# Patient Record
Sex: FEMALE | URBAN - NONMETROPOLITAN AREA
[De-identification: names, ages, dates, MRNs, and addresses within clinical notes are randomized per-mention and may not be internally consistent; named-entity substitution may affect disease eponyms.]

---

## 2023-04-28 ENCOUNTER — TRANSCRIBE ORDERS (OUTPATIENT)
Dept: ADMINISTRATIVE | Facility: HOSPITAL | Age: 43
End: 2023-04-28

## 2023-04-28 DIAGNOSIS — Z12.31 VISIT FOR SCREENING MAMMOGRAM: Primary | ICD-10-CM

## 2023-08-04 ENCOUNTER — HOSPITAL ENCOUNTER (OUTPATIENT)
Dept: MAMMOGRAPHY | Facility: HOSPITAL | Age: 43
Discharge: HOME | End: 2023-08-04
Admitting: NURSE PRACTITIONER
Payer: COMMERCIAL

## 2023-08-04 DIAGNOSIS — Z12.31 VISIT FOR SCREENING MAMMOGRAM: ICD-10-CM

## 2023-08-04 PROCEDURE — 77067 SCR MAMMO BI INCL CAD: CPT

## 2023-08-04 PROCEDURE — 77063 BREAST TOMOSYNTHESIS BI: CPT

## 2023-09-08 ENCOUNTER — DISEASE STATE MANAGEMENT VISIT (OUTPATIENT)
Dept: PHARMACY | Facility: HOSPITAL | Age: 43
End: 2023-09-08
Payer: COMMERCIAL

## 2023-09-08 ENCOUNTER — LAB (OUTPATIENT)
Dept: LAB | Facility: HOSPITAL | Age: 43
End: 2023-09-08
Payer: COMMERCIAL

## 2023-09-08 VITALS
DIASTOLIC BLOOD PRESSURE: 73 MMHG | TEMPERATURE: 98.2 F | SYSTOLIC BLOOD PRESSURE: 121 MMHG | WEIGHT: 148.6 LBS | OXYGEN SATURATION: 99 % | HEART RATE: 104 BPM

## 2023-09-08 DIAGNOSIS — F19.91 HISTORY OF ILLICIT DRUG USE: ICD-10-CM

## 2023-09-08 DIAGNOSIS — F10.21 HISTORY OF ALCOHOLISM: ICD-10-CM

## 2023-09-08 DIAGNOSIS — B18.2 CHRONIC HEPATITIS C WITHOUT HEPATIC COMA: ICD-10-CM

## 2023-09-08 DIAGNOSIS — B18.2 CHRONIC HEPATITIS C WITHOUT HEPATIC COMA: Primary | ICD-10-CM

## 2023-09-08 LAB
AMPHET+METHAMPHET UR QL: NEGATIVE
AMPHETAMINES UR QL: NEGATIVE
BARBITURATES UR QL SCN: NEGATIVE
BENZODIAZ UR QL SCN: NEGATIVE
BUPRENORPHINE SERPL-MCNC: NEGATIVE NG/ML
CANNABINOIDS SERPL QL: NEGATIVE
COCAINE UR QL: NEGATIVE
DEPRECATED RDW RBC AUTO: 39.9 FL (ref 37–54)
ERYTHROCYTE [DISTWIDTH] IN BLOOD BY AUTOMATED COUNT: 12.7 % (ref 12.3–15.4)
HBV SURFACE AG SERPL QL IA: NORMAL
HCG SERPL QL: NEGATIVE
HCT VFR BLD AUTO: 38.2 % (ref 34–46.6)
HGB BLD-MCNC: 13 G/DL (ref 12–15.9)
INR PPP: 0.97 (ref 0.9–1.1)
MCH RBC QN AUTO: 29.6 PG (ref 26.6–33)
MCHC RBC AUTO-ENTMCNC: 34 G/DL (ref 31.5–35.7)
MCV RBC AUTO: 87 FL (ref 79–97)
METHADONE UR QL SCN: NEGATIVE
OPIATES UR QL: NEGATIVE
OXYCODONE UR QL SCN: NEGATIVE
PCP UR QL SCN: NEGATIVE
PLATELET # BLD AUTO: 340 10*3/MM3 (ref 140–450)
PMV BLD AUTO: 11.9 FL (ref 6–12)
PROPOXYPH UR QL: NEGATIVE
PROTHROMBIN TIME: 13.4 SECONDS (ref 12.3–15.1)
RBC # BLD AUTO: 4.39 10*6/MM3 (ref 3.77–5.28)
TRICYCLICS UR QL SCN: NEGATIVE
WBC NRBC COR # BLD: 10.62 10*3/MM3 (ref 3.4–10.8)

## 2023-09-08 PROCEDURE — 86708 HEPATITIS A ANTIBODY: CPT

## 2023-09-08 PROCEDURE — 87522 HEPATITIS C REVRS TRNSCRPJ: CPT

## 2023-09-08 PROCEDURE — 86704 HEP B CORE ANTIBODY TOTAL: CPT

## 2023-09-08 PROCEDURE — G0463 HOSPITAL OUTPT CLINIC VISIT: HCPCS

## 2023-09-08 PROCEDURE — 80306 DRUG TEST PRSMV INSTRMNT: CPT

## 2023-09-08 PROCEDURE — 87340 HEPATITIS B SURFACE AG IA: CPT

## 2023-09-08 PROCEDURE — 80053 COMPREHEN METABOLIC PANEL: CPT

## 2023-09-08 PROCEDURE — 85027 COMPLETE CBC AUTOMATED: CPT

## 2023-09-08 PROCEDURE — 84703 CHORIONIC GONADOTROPIN ASSAY: CPT

## 2023-09-08 PROCEDURE — 36415 COLL VENOUS BLD VENIPUNCTURE: CPT

## 2023-09-08 PROCEDURE — 85610 PROTHROMBIN TIME: CPT

## 2023-09-08 RX ORDER — FLUTICASONE PROPIONATE 50 MCG
2 SPRAY, SUSPENSION (ML) NASAL DAILY PRN
COMMUNITY
Start: 2023-08-08

## 2023-09-08 RX ORDER — ESCITALOPRAM OXALATE 20 MG/1
20 TABLET ORAL DAILY
COMMUNITY
Start: 2023-09-07

## 2023-09-08 RX ORDER — LORATADINE 10 MG/1
10 TABLET ORAL DAILY
COMMUNITY
Start: 2023-05-15

## 2023-09-08 RX ORDER — BUPROPION HYDROCHLORIDE 150 MG/1
150 TABLET, EXTENDED RELEASE ORAL 2 TIMES DAILY
COMMUNITY
Start: 2023-09-07

## 2023-09-08 NOTE — PROGRESS NOTES
New Patient Consult      Date: 2023   Patient Name: Shameka Masters  MRN: [id number]  : 1980     Primary Care Provider: Carmen Elkins APRN  Referring Provider: Severo    Chief Complaint   Patient presents with    Hepatitis C     Pt here for initial Hep C eval     History of Present Illness: Shameka Masters is a 42 y.o. female who is here today as a consultation with Gastroenterology for evaluation of Hepatitis C.    She found out about having Hepatitis C infection approx 5 months ago. She has not had prior treatment for hepatitis. Reports no known personal history of liver disease including other viral hepatitis. There is no known family history of liver disease or cirrhosis. She admits to previous intranasal drug use. No IVDU. She does not have nonprofessional tattoos. Admits to having previous alcoholism, drank 6+ alcoholic beverages per day for approx 2 years. Has been clean now since a few months ago. 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 She does not currently drink alcohol. She denies  current illicit drug use including marijuana. No recent liver imaging. She has not had recent labs. She has had previous vaccinations for Hepatitis A and B. She is currently working full time as a nurse. No current GI complaints today, has known history of anxiety.     Subjective      Past Medical History:   Diagnosis Date    Allergic rhinitis     Generalized anxiety disorder      History reviewed. No pertinent surgical history.    Family History   Problem Relation Age of Onset    Breast cancer Maternal Aunt      Social History     Socioeconomic History    Marital status: Unknown   Tobacco Use    Smoking status: Every Day     Packs/day: 0.50     Types: Cigarettes    Smokeless tobacco: Never   Vaping Use    Vaping Use: Never used   Substance and Sexual Activity    Alcohol use: Not Currently    Drug use: Not Currently    Sexual activity: Defer     Current Outpatient Medications:     buPROPion SR (WELLBUTRIN  SR) 150 MG 12 hr tablet, Take 1 tablet by mouth 2 (Two) Times a Day., Disp: , Rfl:     escitalopram (LEXAPRO) 20 MG tablet, Take 1 tablet by mouth Daily., Disp: , Rfl:     fluticasone (FLONASE) 50 MCG/ACT nasal spray, 2 sprays into the nostril(s) as directed by provider Daily As Needed., Disp: , Rfl:     loratadine (CLARITIN) 10 MG tablet, Take 1 tablet by mouth Daily., Disp: , Rfl:     No Known Allergies    The following portions of the patient's history were reviewed and updated as appropriate: allergies, current medications, past family history, past medical history, past social history, past surgical history and problem list.    Objective     Physical Exam  Vitals reviewed.   Constitutional:       General: She is not in acute distress.     Appearance: Normal appearance. She is well-developed. She is not ill-appearing or diaphoretic.   HENT:      Head: Normocephalic and atraumatic.      Right Ear: External ear normal.      Left Ear: External ear normal.      Nose: Nose normal.   Eyes:      General: No scleral icterus.        Right eye: No discharge.         Left eye: No discharge.      Conjunctiva/sclera: Conjunctivae normal.   Neck:      Vascular: No JVD.   Cardiovascular:      Rate and Rhythm: Normal rate and regular rhythm.      Heart sounds: Normal heart sounds. No murmur heard.    No friction rub. No gallop.   Pulmonary:      Effort: Pulmonary effort is normal. No respiratory distress.      Breath sounds: Normal breath sounds. No wheezing or rales.   Chest:      Chest wall: No tenderness.   Abdominal:      General: Bowel sounds are normal. There is no distension.      Palpations: Abdomen is soft. There is no mass.      Tenderness: There is no abdominal tenderness. There is no guarding.   Musculoskeletal:         General: No deformity. Normal range of motion.      Cervical back: Normal range of motion.   Skin:     General: Skin is warm and dry.      Findings: No erythema or rash.   Neurological:      Mental  Status: She is alert and oriented to person, place, and time.      Coordination: Coordination normal.   Psychiatric:         Behavior: Behavior normal.         Thought Content: Thought content normal.         Judgment: Judgment normal.      Comments: Anxious affect       Vitals:    09/08/23 1128   BP: 121/73   Pulse: 104   Temp: 98.2 °F (36.8 °C)   SpO2: 99%   Weight: 67.4 kg (148 lb 9.6 oz)     Results Review:   I have reviewed the patient's new clinical and imaging results.    Labs HCVRNA quant 7040, hep C genotype Ia, TSH 1.220, hepatitis C antibody positive, triglycerides 91, total cholesterol 189, LDL cholesterol 128, alkaline phosphatase 69, ALT 18, AST 15, total bilirubin 0.5, creatinine 0.65, glucose 99, sodium 141, HIV negative, hemoglobin 14.5, hematocrit 42.3, MCV 88, platelets 415,000.    No recent abdominal imaging to review.    Assessment / Plan      1. Chronic hepatitis C without hepatic coma  2. History of alcoholism  3. History of illicit drug use  She has chronic hepatitis C infection, genotype Ia, treatment naïve, without suspected cirrhosis.  She will have labs today to further further evaluation of her chronic hepatitis C infection as well as for consideration for treatment.    More recommendations will be made after these results have been reviewed. She will continue to abstain from alcohol and illegal drugs. Reports prior vaccinations for both Hepatitis A and B in the past. After review of lab results and discussion with with the patient, we will proceed with Hep C therapy and will submit Rx to insurance company for approval. Treatment will depend on fibrosis score and Hep C genotype. When approved, she will  Rx from our pharmacy and start taking exactly as directed. Hep C viral load lab (HCV RNA quant) and CMP will be checked 4 weeks after start of therapy, at end of therapy and 3 months s/p therapy to determine response to treatment and cure. She will call with concerns.     - CBC (No  Diff); Future  - Comprehensive Metabolic Panel; Future  - hCG, Serum, Qualitative; Future  - HCV FibroSURE; Future  - Hepatitis C RNA, Quantitative, PCR (graph); Future  - Hepatitis A Antibody, Total; Future  - Hepatitis B Core Antibody, Total; Future  - Hepatitis B Surface Antigen; Future  - Protime-INR; Future    - Urine Drug Screen - Urine, Clean Catch; Future    Follow Up:   Return in about 6 weeks (around 10/20/2023) for recheck Hepatitis C (after starting treatment). Will prescribe treatment based on results, she will follow up 1 month after.     Yvette Greene PA-C  [facility]  9/8/2023  16:51 EDT    Dictated Utilizing Dragon Dictation: Part of this note may be an electronic transcription/translation of spoken language to printed text using the Dragon Dictation System.

## 2023-09-08 NOTE — LETTER
2023     ADELE Meléndez  [address] [place] [address]  [place] [id number]    Patient: Shameka Masters   YOB: 1980   Date of Visit: 2023       Dear ADELE Meléndez,    Thank you for referring Shameka Masters to me for evaluation. Below is a copy of my consult note.    If you have questions, please do not hesitate to call me. I look forward to following Shameka along with you.         Sincerely,        [facility]        CC: No Recipients         New Patient Consult      Date: 2023   Patient Name: Shameka Masters  MRN: [id number]  : 1980     Primary Care Provider: Carmen Elkins APRN  Referring Provider: Severo    Chief Complaint   Patient presents with   • Hepatitis C     Pt here for initial Hep C eval     History of Present Illness: Shameka Masters is a 42 y.o. female who is here today as a consultation with Gastroenterology for evaluation of Hepatitis C.    She found out about having Hepatitis C infection approx 5 months ago. She has not had prior treatment for hepatitis. Reports no known personal history of liver disease including other viral hepatitis. There is no known family history of liver disease or cirrhosis. She admits to previous intranasal drug use. No IVDU. She does not have nonprofessional tattoos. Admits to having previous alcoholism, drank 6+ alcoholic beverages per day for approx 2 years. Has been clean now since a few months ago. 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 She denies  current illicit drug use including marijuana. No recent liver imaging. She has not had recent labs. She has had previous vaccinations for Hepatitis A and B. She is currently working full time as a nurse. No current GI complaints today, has known history of anxiety.     Subjective      Past Medical History:   Diagnosis Date   • Allergic rhinitis    • Generalized anxiety disorder      History reviewed. No pertinent surgical  history.    Family History   Problem Relation Age of Onset   • Breast cancer Maternal Aunt      Social History     Socioeconomic History   • Marital status: Unknown   Tobacco Use   • Smoking status: Every Day     Packs/day: 0.50     Types: Cigarettes   • Smokeless tobacco: Never   Vaping Use   • Vaping Use: Never used   Substance and Sexual Activity   • Alcohol use: Not Currently   • Drug use: Not Currently   • Sexual activity: Defer     Current Outpatient Medications:   •  buPROPion SR (WELLBUTRIN SR) 150 MG 12 hr tablet, Take 1 tablet by mouth 2 (Two) Times a Day., Disp: , Rfl:   •  escitalopram (LEXAPRO) 20 MG tablet, Take 1 tablet by mouth Daily., Disp: , Rfl:   •  fluticasone (FLONASE) 50 MCG/ACT nasal spray, 2 sprays into the nostril(s) as directed by provider Daily As Needed., Disp: , Rfl:   •  loratadine (CLARITIN) 10 MG tablet, Take 1 tablet by mouth Daily., Disp: , Rfl:     No Known Allergies    The following portions of the patient's history were reviewed and updated as appropriate: allergies, current medications, past family history, past medical history, past social history, past surgical history and problem list.    Objective     Physical Exam  Vitals reviewed.   Constitutional:       General: She is not in acute distress.     Appearance: Normal appearance. She is well-developed. She is not ill-appearing or diaphoretic.   HENT:      Head: Normocephalic and atraumatic.      Right Ear: External ear normal.      Left Ear: External ear normal.      Nose: Nose normal.   Eyes:      General: No scleral icterus.        Right eye: No discharge.         Left eye: No discharge.      Conjunctiva/sclera: Conjunctivae normal.   Neck:      Vascular: No JVD.   Cardiovascular:      Rate and Rhythm: Normal rate and regular rhythm.      Heart sounds: Normal heart sounds. No murmur heard.    No friction rub. No gallop.   Pulmonary:      Effort: Pulmonary effort is normal. No respiratory distress.      Breath sounds:  Normal breath sounds. No wheezing or rales.   Chest:      Chest wall: No tenderness.   Abdominal:      General: Bowel sounds are normal. There is no distension.      Palpations: Abdomen is soft. There is no mass.      Tenderness: There is no abdominal tenderness. There is no guarding.   Musculoskeletal:         General: No deformity. Normal range of motion.      Cervical back: Normal range of motion.   Skin:     General: Skin is warm and dry.      Findings: No erythema or rash.   Neurological:      Mental Status: She is alert and oriented to person, place, and time.      Coordination: Coordination normal.   Psychiatric:         Behavior: Behavior normal.         Thought Content: Thought content normal.         Judgment: Judgment normal.      Comments: Anxious affect       Vitals:    09/08/23 1128   BP: 121/73   Pulse: 104   Temp: 98.2 °F (36.8 °C)   SpO2: 99%   Weight: 67.4 kg (148 lb 9.6 oz)     Results Review:   I have reviewed the patient's new clinical and imaging results.    Labs HCVRNA quant 7040, hep C genotype Ia, TSH 1.220, hepatitis C antibody positive, triglycerides 91, total cholesterol 189, LDL cholesterol 128, alkaline phosphatase 69, ALT 18, AST 15, total bilirubin 0.5, creatinine 0.65, glucose 99, sodium 141, HIV negative, hemoglobin 14.5, hematocrit 42.3, MCV 88, platelets 415,000.    No recent abdominal imaging to review.    Assessment / Plan      1. Chronic hepatitis C without hepatic coma  2. History of alcoholism  3. History of illicit drug use  She has chronic hepatitis C infection, genotype Ia, treatment naïve, without suspected cirrhosis.  She will have labs today to further further evaluation of her chronic hepatitis C infection as well as for consideration for treatment.    More recommendations will be made after these results have been reviewed. She will continue to abstain from alcohol and illegal drugs. Reports prior vaccinations for both Hepatitis A and B in the past. After review of  lab results and discussion with with the patient, we will proceed with Hep C therapy and will submit Rx to insurance company for approval. Treatment will depend on fibrosis score and Hep C genotype. When approved, she will  Rx from our pharmacy and start taking exactly as directed. Hep C viral load lab (HCV RNA quant) and CMP will be checked 4 weeks after start of therapy, at end of therapy and 3 months s/p therapy to determine response to treatment and cure. She will call with concerns.     - CBC (No Diff); Future  - Comprehensive Metabolic Panel; Future  - hCG, Serum, Qualitative; Future  - HCV FibroSURE; Future  - Hepatitis C RNA, Quantitative, PCR (graph); Future  - Hepatitis A Antibody, Total; Future  - Hepatitis B Core Antibody, Total; Future  - Hepatitis B Surface Antigen; Future  - Protime-INR; Future    - Urine Drug Screen - Urine, Clean Catch; Future    Follow Up:   Return in about 6 weeks (around 10/20/2023) for recheck Hepatitis C (after starting treatment). Will prescribe treatment based on results, she will follow up 1 month after.     Yvette Greene PA-C  [facility]  9/8/2023  16:51 EDT    Dictated Utilizing Dragon Dictation: Part of this note may be an electronic transcription/translation of spoken language to printed text using the Dragon Dictation System.

## 2023-09-09 LAB
ALBUMIN SERPL-MCNC: 4.3 G/DL (ref 3.5–5.2)
ALBUMIN/GLOB SERPL: 1.4 G/DL
ALP SERPL-CCNC: 55 U/L (ref 39–117)
ALT SERPL W P-5'-P-CCNC: 44 U/L (ref 1–33)
ANION GAP SERPL CALCULATED.3IONS-SCNC: 14.9 MMOL/L (ref 5–15)
AST SERPL-CCNC: 28 U/L (ref 1–32)
BILIRUB SERPL-MCNC: 0.4 MG/DL (ref 0–1.2)
BUN SERPL-MCNC: 9 MG/DL (ref 6–20)
BUN/CREAT SERPL: 14.8 (ref 7–25)
CALCIUM SPEC-SCNC: 9.1 MG/DL (ref 8.6–10.5)
CHLORIDE SERPL-SCNC: 98 MMOL/L (ref 98–107)
CO2 SERPL-SCNC: 22.1 MMOL/L (ref 22–29)
CREAT SERPL-MCNC: 0.61 MG/DL (ref 0.57–1)
EGFRCR SERPLBLD CKD-EPI 2021: 115.4 ML/MIN/1.73
GLOBULIN UR ELPH-MCNC: 3 GM/DL
GLUCOSE SERPL-MCNC: 88 MG/DL (ref 65–99)
HAV AB SER QL IA: POSITIVE
HBV CORE AB SERPL QL IA: NEGATIVE
POTASSIUM SERPL-SCNC: 3.9 MMOL/L (ref 3.5–5.2)
PROT SERPL-MCNC: 7.3 G/DL (ref 6–8.5)
SODIUM SERPL-SCNC: 135 MMOL/L (ref 136–145)

## 2023-09-12 LAB
A2 MACROGLOB SERPL-MCNC: 215 MG/DL (ref 110–276)
ALT SERPL W P-5'-P-CCNC: 49 IU/L (ref 0–40)
APO A-I SERPL-MCNC: 127 MG/DL (ref 116–209)
BILIRUB SERPL-MCNC: 0.3 MG/DL (ref 0–1.2)
FIBROSIS SCORING:: ABNORMAL
FIBROSIS STAGE SERPL QL: ABNORMAL
GGT SERPL-CCNC: 13 IU/L (ref 0–60)
HAPTOGLOB SERPL-MCNC: 122 MG/DL (ref 42–296)
HCV AB SER QL: ABNORMAL
HCV RNA SERPL NAA+PROBE-ACNC: NORMAL IU/ML
HCV RNA SERPL NAA+PROBE-LOG IU: 4.31 LOG10 IU/ML
LABORATORY COMMENT REPORT: ABNORMAL
LIVER FIBR SCORE SERPL CALC.FIBROSURE: 0.11 (ref 0–0.21)
NECROINFLAMM ACTIVITY SCORING:: ABNORMAL
NECROINFLAMMATORY ACT GRADE SERPL QL: ABNORMAL
NECROINFLAMMATORY ACT SCORE SERPL: 0.23 (ref 0–0.17)
SERVICE CMNT-IMP: ABNORMAL
TEST INFORMATION: NORMAL
TEST PERFORMANCE INFO SPEC: ABNORMAL

## 2023-09-13 ENCOUNTER — TELEPHONE (OUTPATIENT)
Dept: GASTROENTEROLOGY | Facility: CLINIC | Age: 43
End: 2023-09-13
Payer: COMMERCIAL

## 2023-09-13 ENCOUNTER — SPECIALTY PHARMACY (OUTPATIENT)
Dept: PHARMACY | Facility: HOSPITAL | Age: 43
End: 2023-09-13
Payer: COMMERCIAL

## 2023-09-13 DIAGNOSIS — B18.2 CHRONIC HEPATITIS C WITHOUT HEPATIC COMA: Primary | ICD-10-CM

## 2023-09-13 RX ORDER — GLECAPREVIR AND PIBRENTASVIR 40; 100 MG/1; MG/1
3 TABLET, FILM COATED ORAL DAILY
Qty: 84 TABLET | Refills: 1
Start: 2023-09-13 | End: 2023-09-15 | Stop reason: SDUPTHER

## 2023-09-13 NOTE — TELEPHONE ENCOUNTER
She has chronic Hepatitis C infection, genotype 1a, treatment naive, without cirrhosis (F0). I have prescribed Mavyret 3 tabs PO once daily for 8 weeks.     No vaccinations needed.

## 2023-09-15 ENCOUNTER — SPECIALTY PHARMACY (OUTPATIENT)
Dept: PHARMACY | Facility: HOSPITAL | Age: 43
End: 2023-09-15
Payer: COMMERCIAL

## 2023-09-15 DIAGNOSIS — B18.2 CHRONIC HEPATITIS C WITHOUT HEPATIC COMA: ICD-10-CM

## 2023-09-15 RX ORDER — GLECAPREVIR AND PIBRENTASVIR 40; 100 MG/1; MG/1
3 TABLET, FILM COATED ORAL DAILY
Qty: 84 TABLET | Refills: 1 | Status: SHIPPED | OUTPATIENT
Start: 2023-09-15

## 2023-09-15 NOTE — PROGRESS NOTES
[facility]  Hepatitis C Initial Assessment     Shameka Masters is a 42 y.o. female diagnosed with Hepatitis C and enrolled in the [facility] for treatment. An initial outreach was conducted, including assessment of therapy appropriateness and specialty medication education for Mavyret.    Previous Hep C Treatment: Patient is treatment naïve    Relevant Past Medical History and Comorbidities  Past Medical History:   Diagnosis Date    Allergic rhinitis     Generalized anxiety disorder      Social History     Socioeconomic History    Marital status: Unknown   Tobacco Use    Smoking status: Every Day     Packs/day: 0.50     Types: Cigarettes    Smokeless tobacco: Never   Vaping Use    Vaping Use: Never used   Substance and Sexual Activity    Alcohol use: Not Currently    Drug use: Not Currently    Sexual activity: Defer       Allergies  Patient has no known allergies.    Insurance Coverage & Financial Support: [facility]; [employer] secondary    Current Medication List:    Current Outpatient Medications:     Glecaprevir-Pibrentasvir (Mavyret) 100-40 MG tablet, Take 3 tablets by mouth Daily. Take all 3 tablets by mouth once daily with food, Disp: 84 tablet, Rfl: 1    buPROPion SR (WELLBUTRIN SR) 150 MG 12 hr tablet, Take 1 tablet by mouth 2 (Two) Times a Day., Disp: , Rfl:     escitalopram (LEXAPRO) 20 MG tablet, Take 1 tablet by mouth Daily., Disp: , Rfl:     fluticasone (FLONASE) 50 MCG/ACT nasal spray, 2 sprays into the nostril(s) as directed by provider Daily As Needed., Disp: , Rfl:     loratadine (CLARITIN) 10 MG tablet, Take 1 tablet by mouth Daily., Disp: , Rfl:     Drug Interactions:  Medication list was reviewed for drug-drug interactions, no interactions with Mavyret noted    Relevant Laboratory Values:  Lab Results   Component Value Date    GLUCOSE 88 09/08/2023    CALCIUM 9.1 09/08/2023     (L) 09/08/2023    K 3.9 09/08/2023    CO2 22.1 09/08/2023    CL 98 09/08/2023    BUN 9  09/08/2023    CREATININE 0.61 09/08/2023    BCR 14.8 09/08/2023    ANIONGAP 14.9 09/08/2023    AST 28 09/08/2023    ALT 44 (H) 09/08/2023     Lab Results   Component Value Date    WBC 10.62 09/08/2023    HGB 13.0 09/08/2023    HCT 38.2 09/08/2023    MCV 87.0 09/08/2023     09/08/2023       HCV RNA quant: 20400  No results found for: HCVGENOTYPE    Fibrosis: F0    FIB-4: 0.51    Immunizations:  Hepatitis A: immune  Hepatitis B: immune  Lab Results   Component Value Date    HAV Positive (A) 09/08/2023    HEPBCAB Negative 09/08/2023         Co-infection Evaluation:  HIV -- Negative  Hepatitis B -- negative      Initial Education Provided for Mavyret  The patient has been provided with the following education for MAVYRET (glecaprevir and pibrentasvir). All questions and concerns have been addressed prior to the patient receiving the medication, and the patient has verbalized understanding of the education and any materials provided. Additional patient education shall be provided and documented upon request by the patient, provider or payer.      The following counseling points for Mavyret (glecaprevir and pibrentasvir) were addressed:  Goal of treatment:  This medication is used to treat hepatitis C infection with the goal of curing infection.  Directions for use:  Take 3 tablets by mouth once daily for a total of 8 weeks. Take tablets at the same time each day, with food.  Missed doses:  It is very important that you do not miss a dose of this medication. Use a pill planner, medication adherence sobia or other tool to help you remember to take your medication.  If you do miss a dose and it is within 18 hours from the usual dosage time, administer dose as soon as possible, then administer next dose at the usual dosage time. If more than 18 hours from the usual dosage time has passed, skip the missed dose and administer the next dose at the usual time.  Do not stop taking this medication without talking to your  provider.  Adverse effects:  Frequently reported side effects of this medication include: headache, loss of energy, nausea and diarrhea.   Go to the ED or call 911 with signs of a significant reaction including wheezing; chest tightness; fever; itching; bad cough; blue skin color; seizures; or swelling of face, lips, tongue or throat.   Hepatic decompensation and hepatic failure have been reported. This typically occurs within the first 4 weeks of treatment initiation. Talk to your doctor right away if you notice dark urine, fatigue, lack of appetite, nausea, abdominal pain, light-colored stools, vomiting or yellow skin.  Follow-Up:  Be sure to keep your follow up appointment with our clinic 4 weeks after starting this medication to ensure you are tolerating it well and that you are responding appropriately to therapy.   Make sure to tell your doctor and pharmacist about all medications you are taking, including herbal supplements and OTC products at each visit. Contact clinic if any new medications are started during treatment.  Storage:  Store this medication at room temperature, away from any extreme temperatures or moisture exposure.    Patient Specific Counseling Points:   Use of contraception during treatment in females of childbearing potential is recommended. Consider postponing pregnancy until therapy is complete to reduce the risk of HCV transmission. This medication should not be used in pregnant females and it is not known if the medication is present in breast milk.   Patient reports  has had a vasectomy.      Adherence and Self-Administration  Barriers to Patient Adherence and/or Self-Administration: None  Methods for Supporting Patient Adherence and/or Self-Administration: None Required     Associated Vaccinations   Your chronic liver disease puts you at risk for serious complications if you get infected with hepatitis A virus. If you've never been vaccinated against hepatitis A, you need 2  doses of this vaccine, usually spaced 6-19 months apart.     If you already have chronic hepatitis B infection, you won't need a hepatitis B vaccine.  However, if you do not have sufficient Hepatitis B antibodies (either not vaccinated or insufficient response to vaccination), you should get the Hepatitis B vaccination series.  The vaccine is given in 2 or 3 doses, depending on the brand.     A combination A & B vaccination is also available if both are needed.     Contraindications: Severe allergic reaction (eg, anaphylaxis) after a previous dose of any hepatitis A-containing or hepatitis B-containing vaccine or any component of the formulation, including yeast and neomycin.   Precautions: Consider deferring administration in patients with moderate or severe acute illness. Use with caution in patients with bleeding disorders or severely immunocompromised patients    Shameka Masters was counseled that the following immunizations are recommended: none    Goals of Therapy:  Patient Goals of Therapy: Adherence  Clinical Goals or Therapeutic Targets, If Applicable: SVR 12 weeks    Attestation:  I attest that the initiated specialty medication is appropriate for the patient based on my assessment.  If the prescribed therapy is at any point deemed not appropriate based on the current or future assessments, a consultation will be initiated with the patient's specialty care provider to determine the best course of action. The revised plan of therapy will be documented along with any additional patient education provided.     Assessment & Plan:  Patient has Hepatitis C, genotype 1a (F0)(calculated FIB-4 = 0.51) and is treatment naïve. Patient has been prescribed Mavyret 3 tablets daily with food x 8 weeks. Medication education and counseling provided as above.  The patient would like to  at [facility] retail pharmacy  The following immunizations are needed: none.   Patient will follow up in clinic 4 weeks after starting  medication to assess virologic response and medication tolerability.     Thaddeus Evans RPH  9/15/2023  11:24 EDT

## 2023-10-13 ENCOUNTER — LAB (OUTPATIENT)
Dept: LAB | Facility: HOSPITAL | Age: 43
End: 2023-10-13
Payer: COMMERCIAL

## 2023-10-13 ENCOUNTER — DISEASE STATE MANAGEMENT VISIT (OUTPATIENT)
Dept: PHARMACY | Facility: HOSPITAL | Age: 43
End: 2023-10-13
Payer: COMMERCIAL

## 2023-10-13 VITALS
DIASTOLIC BLOOD PRESSURE: 68 MMHG | SYSTOLIC BLOOD PRESSURE: 111 MMHG | OXYGEN SATURATION: 99 % | HEART RATE: 98 BPM | WEIGHT: 144 LBS

## 2023-10-13 DIAGNOSIS — F10.21 HISTORY OF ALCOHOLISM: ICD-10-CM

## 2023-10-13 DIAGNOSIS — R74.01 ELEVATED ALT MEASUREMENT: ICD-10-CM

## 2023-10-13 DIAGNOSIS — F19.91 HISTORY OF ILLICIT DRUG USE: ICD-10-CM

## 2023-10-13 DIAGNOSIS — B18.2 CHRONIC HEPATITIS C WITHOUT HEPATIC COMA: Primary | ICD-10-CM

## 2023-10-13 DIAGNOSIS — B18.2 CHRONIC HEPATITIS C WITHOUT HEPATIC COMA: ICD-10-CM

## 2023-10-13 LAB
ALBUMIN SERPL-MCNC: 4.7 G/DL (ref 3.5–5.2)
ALBUMIN/GLOB SERPL: 2 G/DL
ALP SERPL-CCNC: 59 U/L (ref 39–117)
ALT SERPL W P-5'-P-CCNC: 9 U/L (ref 1–33)
ANION GAP SERPL CALCULATED.3IONS-SCNC: 11 MMOL/L (ref 5–15)
AST SERPL-CCNC: 17 U/L (ref 1–32)
BILIRUB SERPL-MCNC: 0.3 MG/DL (ref 0–1.2)
BUN SERPL-MCNC: 9 MG/DL (ref 6–20)
BUN/CREAT SERPL: 14.3 (ref 7–25)
CALCIUM SPEC-SCNC: 9.5 MG/DL (ref 8.6–10.5)
CHLORIDE SERPL-SCNC: 105 MMOL/L (ref 98–107)
CO2 SERPL-SCNC: 25 MMOL/L (ref 22–29)
CREAT SERPL-MCNC: 0.63 MG/DL (ref 0.57–1)
EGFRCR SERPLBLD CKD-EPI 2021: 114.5 ML/MIN/1.73
GLOBULIN UR ELPH-MCNC: 2.4 GM/DL
GLUCOSE SERPL-MCNC: 91 MG/DL (ref 65–99)
POTASSIUM SERPL-SCNC: 4 MMOL/L (ref 3.5–5.2)
PROT SERPL-MCNC: 7.1 G/DL (ref 6–8.5)
SODIUM SERPL-SCNC: 141 MMOL/L (ref 136–145)

## 2023-10-13 PROCEDURE — 80053 COMPREHEN METABOLIC PANEL: CPT

## 2023-10-13 PROCEDURE — 87522 HEPATITIS C REVRS TRNSCRPJ: CPT

## 2023-10-13 PROCEDURE — 36415 COLL VENOUS BLD VENIPUNCTURE: CPT

## 2023-10-13 PROCEDURE — G0463 HOSPITAL OUTPT CLINIC VISIT: HCPCS

## 2023-10-13 NOTE — LETTER
2023     ADELE Meléndez  [id number]  [place] [id number]    Patient: Shameka Masters   YOB: 1980   Date of Visit: 10/13/2023       Dear ADELE Meléndez    Shameka Masters was in my office today. Below is a copy of my note.    If you have questions, please do not hesitate to call me. I look forward to following Shameka along with you.         Sincerely,        [facility]        CC: No Recipients         Follow Up Note     Date: 10/13/2023   Patient Name: Shameka Masters  MRN: [id number]  : 1980     Primary Care Provider: Carmen Elkins APRN     Chief Complaint   Patient presents with    Hepatitis C     Pt here for 4 wk f/up     History of present illness:   10/13/2023  Shameka Masters is a 42 y.o. female who is here today for follow up regarding Hepatitis C.    She has been taking Mavyret 3 tabs PO once daily now for the past 4 weeks. She started treatment on 9/15/2023. She has not missed any doses. She has not noticed any side effects. She continues to abstain from alcohol and illicit drug use.     Interval History:  2023  She found out about having Hepatitis C infection approx 5 months ago. She has not had prior treatment for hepatitis. Reports no known personal history of liver disease including other viral hepatitis. There is no known family history of liver disease or cirrhosis. She admits to previous intranasal drug use. No IVDU. She does not have nonprofessional tattoos. Admits to having previous alcoholism, drank 6+ alcoholic beverages per day for approx 2 years. Has been clean now since a few months ago. 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 She does not currently drink alcohol. She denies  current illicit drug use including marijuana. No recent liver imaging. She has not had recent labs. She has had previous vaccinations for Hepatitis A and B. She is currently working full time as a nurse. No current GI complaints today, has known history of  anxiety.     Subjective     Past Medical History:   Diagnosis Date    Allergic rhinitis     Generalized anxiety disorder      History reviewed. No pertinent surgical history.    Family History   Problem Relation Age of Onset    Breast cancer Maternal Aunt      Social History     Socioeconomic History    Marital status:    Tobacco Use    Smoking status: Every Day     Packs/day: .5     Types: Cigarettes    Smokeless tobacco: Never   Vaping Use    Vaping Use: Never used   Substance and Sexual Activity    Alcohol use: Not Currently    Drug use: Not Currently    Sexual activity: Defer     Current Outpatient Medications:     buPROPion SR (WELLBUTRIN SR) 150 MG 12 hr tablet, Take 1 tablet by mouth 2 (Two) Times a Day., Disp: , Rfl:     escitalopram (LEXAPRO) 20 MG tablet, Take 1 tablet by mouth Daily., Disp: , Rfl:     fluticasone (FLONASE) 50 MCG/ACT nasal spray, 2 sprays into the nostril(s) as directed by provider Daily As Needed., Disp: , Rfl:     Glecaprevir-Pibrentasvir (Mavyret) 100-40 MG tablet, Take 3 tablets by mouth Daily. Take all 3 tablets by mouth once daily with food, Disp: 84 tablet, Rfl: 1    loratadine (CLARITIN) 10 MG tablet, Take 1 tablet by mouth Daily., Disp: , Rfl:     No Known Allergies    The following portions of the patient's history were reviewed and updated as appropriate: allergies, current medications, past family history, past medical history, past social history, past surgical history and problem list.    Objective     Physical Exam  Constitutional:       General: She is not in acute distress.     Appearance: Normal appearance. She is well-developed. She is not diaphoretic.   HENT:      Head: Normocephalic and atraumatic.      Right Ear: External ear normal.      Left Ear: External ear normal.      Nose: Nose normal.   Eyes:      General: No scleral icterus.        Right eye: No discharge.         Left eye: No discharge.      Conjunctiva/sclera: Conjunctivae normal.    Neck:      Trachea: No tracheal deviation.   Pulmonary:      Effort: Pulmonary effort is normal. No respiratory distress.   Musculoskeletal:         General: Normal range of motion.      Cervical back: Normal range of motion.   Skin:     Coloration: Skin is not pale.      Findings: No erythema or rash.   Neurological:      Mental Status: She is alert and oriented to person, place, and time.      Coordination: Coordination normal.   Psychiatric:         Mood and Affect: Mood normal.         Behavior: Behavior normal.         Thought Content: Thought content normal.         Judgment: Judgment normal.       Vitals:    10/13/23 0925   BP: 111/68   Pulse: 98   SpO2: 99%   Weight: 65.3 kg (144 lb)     Results Review:   I reviewed the patient's new clinical results.    Lab on 09/08/2023   Component Date Value Ref Range Status    Protime 09/08/2023 13.4  12.3 - 15.1 Seconds Final    INR 09/08/2023 0.97  0.90 - 1.10 Final    WBC 09/08/2023 10.62  3.40 - 10.80 10*3/mm3 Final    RBC 09/08/2023 4.39  3.77 - 5.28 10*6/mm3 Final    Hemoglobin 09/08/2023 13.0  12.0 - 15.9 g/dL Final    Hematocrit 09/08/2023 38.2  34.0 - 46.6 % Final    MCV 09/08/2023 87.0  79.0 - 97.0 fL Final    MCH 09/08/2023 29.6  26.6 - 33.0 pg Final    MCHC 09/08/2023 34.0  31.5 - 35.7 g/dL Final    RDW 09/08/2023 12.7  12.3 - 15.4 % Final    RDW-SD 09/08/2023 39.9  37.0 - 54.0 fl Final    MPV 09/08/2023 11.9  6.0 - 12.0 fL Final    Platelets 09/08/2023 340  140 - 450 10*3/mm3 Final    Glucose 09/08/2023 88  65 - 99 mg/dL Final    BUN 09/08/2023 9  6 - 20 mg/dL Final    Creatinine 09/08/2023 0.61  0.57 - 1.00 mg/dL Final    Sodium 09/08/2023 135 (L)  136 - 145 mmol/L Final    Potassium 09/08/2023 3.9  3.5 - 5.2 mmol/L Final    Chloride 09/08/2023 98  98 - 107 mmol/L Final    CO2 09/08/2023 22.1  22.0 - 29.0 mmol/L Final    Calcium 09/08/2023 9.1  8.6 - 10.5 mg/dL Final    Total Protein 09/08/2023 7.3  6.0 - 8.5 g/dL Final    Albumin  09/08/2023 4.3  3.5 - 5.2 g/dL Final    ALT (SGPT) 09/08/2023 44 (H)  1 - 33 U/L Final    AST (SGOT) 09/08/2023 28  1 - 32 U/L Final    Alkaline Phosphatase 09/08/2023 55  39 - 117 U/L Final    Total Bilirubin 09/08/2023 0.4  0.0 - 1.2 mg/dL Final    Globulin 09/08/2023 3.0  gm/dL Final    A/G Ratio 09/08/2023 1.4  g/dL Final    BUN/Creatinine Ratio 09/08/2023 14.8  7.0 - 25.0 Final    Anion Gap 09/08/2023 14.9  5.0 - 15.0 mmol/L Final    eGFR 09/08/2023 115.4  >60.0 mL/min/1.73 Final    HCG Qualitative 09/08/2023 Negative  Negative Final    Fibrosis Score 09/08/2023 0.11  0.00 - 0.21 Final    Fibrosis Stage 09/08/2023 Comment   Final                       F0 - No fibrosis    Necroinflammat Activity Score 09/08/2023 0.23 (H)  0.00 - 0.17 Final    Necroinflammat Activity Grade 09/08/2023 A0-A1   Final    Methodology: 09/08/2023 Comment   Final    The analytes tested are performed by [employer] methods.  Not intended for use with other diagnostic considerations.    Alpha 2-Macroglobulins, Qn 09/08/2023 215  110 - 276 mg/dL Final    Haptoglobin 09/08/2023 122  42 - 296 mg/dL Final    Apolipoprotein A-1 09/08/2023 127  116 - 209 mg/dL Final    Total Bilirubin 09/08/2023 0.3  0.0 - 1.2 mg/dL Final    GGT 09/08/2023 13  0 - 60 IU/L Final    ALT (SGPT) 09/08/2023 49 (H)  0 - 40 IU/L Final    Hepatitis C Quantitation 09/08/2023 07252  IU/mL Final    HCV log10 09/08/2023 4.310  log10 IU/mL Final    Test Information 09/08/2023 Comment   Final    The quantitative range of this assay is 15 IU/mL to 100 million IU/mL.    Hep A Total Ab 09/08/2023 Positive (A)  Negative Final    Hep B Core Total Ab 09/08/2023 Negative  Negative Final    Hepatitis B Surface Ag 09/08/2023 Non-Reactive  Non-Reactive Final    THC, Screen, Urine 09/08/2023 Negative  Negative Final    Phencyclidine (PCP), Urine 09/08/2023 Negative  Negative Final    Cocaine Screen, Urine 09/08/2023 Negative  Negative Final     Methamphetamine, Ur 09/08/2023 Negative  Negative Final    Opiate Screen 09/08/2023 Negative  Negative Final    Amphetamine Screen, Urine 09/08/2023 Negative  Negative Final    Benzodiazepine Screen, Urine 09/08/2023 Negative  Negative Final    Tricyclic Antidepressants Screen 09/08/2023 Negative  Negative Final    Methadone Screen, Urine 09/08/2023 Negative  Negative Final    Barbiturates Screen, Urine 09/08/2023 Negative  Negative Final    Oxycodone Screen, Urine 09/08/2023 Negative  Negative Final    Propoxyphene Screen 09/08/2023 Negative  Negative Final    Buprenorphine, Screen, Urine 09/08/2023 Negative  Negative Final      Labs 4/17/2023 HCVRNA quant 7040, hep C genotype Ia, TSH 1.220, hepatitis C antibody positive, triglycerides 91, total cholesterol 189, LDL cholesterol 128, alkaline phosphatase 69, ALT 18, AST 15, total bilirubin 0.5, creatinine 0.65, glucose 99, sodium 141, HIV negative, hemoglobin 14.5, hematocrit 42.3, MCV 88, platelets 415,000.     Assessment / Plan      1. Chronic hepatitis C without hepatic coma  2. History of alcoholism  3. History of illicit drug use  4. Elevated ALT measurement  She has chronic hepatitis C infection, genotype 1a, treatment na‹ve, without cirrhosis (F0). She has been taking Mavyret 3 tabs PO once daily for the past 4 weeks for Hepatitis C therapy. She will continue to take this medication at the same time every day without missing any doses for total duration of 8 weeks. Hep C viral load lab (HCV RNA quant) and CMP will be checked today which is approx 4 weeks after start of therapy. Labs will be completed again at end of therapy and final labs 3 months s/p therapy to determine response to treatment and cure. She will continue to abstain from alcohol use at this time and agrees not to use illegal drugs. She understands to avoid any high risk behavior to prevent any reinfection during treatment and after treatment. She is immune to hepatitis A and reports  vaccines to Hepatitis B in the past. Source of infection is her previous illicit drug use but she is currently not using. Liver imaging not completed. HIV test is negative. No history of liver transplant.     Labs today    - Hepatitis C RNA, Quantitative, PCR (graph); Future  - Comprehensive Metabolic Panel; Future    Follow Up:   Return in about 4 months (around 2/13/2024) for recheck Hepatitis C (after final labs completed).    Yvette Greene PA-C  [facility]  10/13/2023  09:47 EDT    Dictated Utilizing Dragon Dictation: Part of this note may be an electronic transcription/translation of spoken language to printed text using the Dragon Dictation System.   I have personally seen and examined the patient. I have collaborated with and supervised the

## 2023-10-16 LAB
HCV RNA SERPL NAA+PROBE-ACNC: NORMAL IU/ML
TEST INFORMATION: NORMAL

## 2023-10-17 ENCOUNTER — TELEPHONE (OUTPATIENT)
Dept: GASTROENTEROLOGY | Facility: CLINIC | Age: 43
End: 2023-10-17
Payer: COMMERCIAL

## 2023-10-17 DIAGNOSIS — B18.2 CHRONIC HEPATITIS C WITHOUT HEPATIC COMA: Primary | ICD-10-CM

## 2023-10-17 NOTE — TELEPHONE ENCOUNTER
After 4 weeks of Mavyret, HCV not detected and liver enzymes normal. Please let her know. She will continue taking Mavyret and repeat labs at end of treatment, ordered.

## 2023-11-10 ENCOUNTER — LAB (OUTPATIENT)
Dept: LAB | Facility: HOSPITAL | Age: 43
End: 2023-11-10
Payer: COMMERCIAL

## 2023-11-10 ENCOUNTER — SPECIALTY PHARMACY (OUTPATIENT)
Dept: PHARMACY | Facility: HOSPITAL | Age: 43
End: 2023-11-10
Payer: COMMERCIAL

## 2023-11-10 DIAGNOSIS — B18.2 CHRONIC HEPATITIS C WITHOUT HEPATIC COMA: ICD-10-CM

## 2023-11-10 LAB
ALBUMIN SERPL-MCNC: 4.5 G/DL (ref 3.5–5.2)
ALBUMIN/GLOB SERPL: 1.6 G/DL
ALP SERPL-CCNC: 63 U/L (ref 39–117)
ALT SERPL W P-5'-P-CCNC: 11 U/L (ref 1–33)
ANION GAP SERPL CALCULATED.3IONS-SCNC: 9.4 MMOL/L (ref 5–15)
AST SERPL-CCNC: 17 U/L (ref 1–32)
BILIRUB SERPL-MCNC: 0.3 MG/DL (ref 0–1.2)
BUN SERPL-MCNC: 7 MG/DL (ref 6–20)
BUN/CREAT SERPL: 10.4 (ref 7–25)
CALCIUM SPEC-SCNC: 9.3 MG/DL (ref 8.6–10.5)
CHLORIDE SERPL-SCNC: 101 MMOL/L (ref 98–107)
CO2 SERPL-SCNC: 25.6 MMOL/L (ref 22–29)
CREAT SERPL-MCNC: 0.67 MG/DL (ref 0.57–1)
EGFRCR SERPLBLD CKD-EPI 2021: 112.1 ML/MIN/1.73
GLOBULIN UR ELPH-MCNC: 2.8 GM/DL
GLUCOSE SERPL-MCNC: 93 MG/DL (ref 65–99)
POTASSIUM SERPL-SCNC: 3.9 MMOL/L (ref 3.5–5.2)
PROT SERPL-MCNC: 7.3 G/DL (ref 6–8.5)
SODIUM SERPL-SCNC: 136 MMOL/L (ref 136–145)

## 2023-11-10 PROCEDURE — 36415 COLL VENOUS BLD VENIPUNCTURE: CPT

## 2023-11-10 PROCEDURE — 87522 HEPATITIS C REVRS TRNSCRPJ: CPT

## 2023-11-10 PROCEDURE — 80053 COMPREHEN METABOLIC PANEL: CPT

## 2023-11-13 LAB
HCV RNA SERPL NAA+PROBE-ACNC: NORMAL IU/ML
TEST INFORMATION: NORMAL

## 2023-11-15 ENCOUNTER — TELEPHONE (OUTPATIENT)
Dept: GASTROENTEROLOGY | Facility: CLINIC | Age: 43
End: 2023-11-15
Payer: COMMERCIAL

## 2023-11-15 DIAGNOSIS — B18.2 CHRONIC HEPATITIS C WITHOUT HEPATIC COMA: Primary | ICD-10-CM

## 2023-11-15 NOTE — TELEPHONE ENCOUNTER
HCV RNA not detected and liver enzymes normal at end of Mavyret therapy. She will need labs again 3 mo s/p completion of labs and final follow up with me 1-2 weeks after.

## 2023-11-17 NOTE — TELEPHONE ENCOUNTER
Spoke to patient and told her HCV RNA no longer detected and liver enzymes returned to normal. She will repeat labs in 3 months and follow up appt has been made.    Pt verbalized understanding.

## 2024-02-08 ENCOUNTER — LAB (OUTPATIENT)
Dept: LAB | Facility: HOSPITAL | Age: 44
End: 2024-02-08
Payer: COMMERCIAL

## 2024-02-08 DIAGNOSIS — B18.2 CHRONIC HEPATITIS C WITHOUT HEPATIC COMA: ICD-10-CM

## 2024-02-08 LAB
ALBUMIN SERPL-MCNC: 4.7 G/DL (ref 3.5–5.2)
ALBUMIN/GLOB SERPL: 2.5 G/DL
ALP SERPL-CCNC: 44 U/L (ref 39–117)
ALT SERPL W P-5'-P-CCNC: 6 U/L (ref 1–33)
ANION GAP SERPL CALCULATED.3IONS-SCNC: 7 MMOL/L (ref 5–15)
AST SERPL-CCNC: 10 U/L (ref 1–32)
BILIRUB SERPL-MCNC: 0.2 MG/DL (ref 0–1.2)
BUN SERPL-MCNC: 11 MG/DL (ref 6–20)
BUN/CREAT SERPL: 18 (ref 7–25)
CALCIUM SPEC-SCNC: 8.9 MG/DL (ref 8.6–10.5)
CHLORIDE SERPL-SCNC: 105 MMOL/L (ref 98–107)
CO2 SERPL-SCNC: 23 MMOL/L (ref 22–29)
CREAT SERPL-MCNC: 0.61 MG/DL (ref 0.57–1)
EGFRCR SERPLBLD CKD-EPI 2021: 114.6 ML/MIN/1.73
GLOBULIN UR ELPH-MCNC: 1.9 GM/DL
GLUCOSE SERPL-MCNC: 97 MG/DL (ref 65–99)
POTASSIUM SERPL-SCNC: 4.4 MMOL/L (ref 3.5–5.2)
PROT SERPL-MCNC: 6.6 G/DL (ref 6–8.5)
SODIUM SERPL-SCNC: 135 MMOL/L (ref 136–145)

## 2024-02-08 PROCEDURE — 87522 HEPATITIS C REVRS TRNSCRPJ: CPT

## 2024-02-08 PROCEDURE — 36415 COLL VENOUS BLD VENIPUNCTURE: CPT

## 2024-02-08 PROCEDURE — 80053 COMPREHEN METABOLIC PANEL: CPT

## 2024-02-10 LAB
HCV RNA SERPL NAA+PROBE-ACNC: NORMAL IU/ML
TEST INFORMATION: NORMAL

## 2024-02-16 ENCOUNTER — SPECIALTY PHARMACY (OUTPATIENT)
Dept: PHARMACY | Facility: HOSPITAL | Age: 44
End: 2024-02-16
Payer: COMMERCIAL

## 2024-02-16 VITALS
SYSTOLIC BLOOD PRESSURE: 103 MMHG | HEART RATE: 80 BPM | OXYGEN SATURATION: 100 % | WEIGHT: 149 LBS | DIASTOLIC BLOOD PRESSURE: 63 MMHG

## 2024-02-16 DIAGNOSIS — Z86.19 HISTORY OF HEPATITIS C VIRUS INFECTION: Primary | ICD-10-CM

## 2024-02-16 PROCEDURE — G0463 HOSPITAL OUTPT CLINIC VISIT: HCPCS

## 2024-02-16 NOTE — PROGRESS NOTES
Follow Up Note     Date: 2024   Patient Name: Shameka Masters  MRN: [id number]  : 1980     Primary Care Provider: Carmen Elkins APRN     Chief Complaint   Patient presents with    Hepatitis C     Pt here for 3 month f/up     History of present illness:   2024  Shameka Masters is a 43 y.o. female who is here today for follow up regarding Hepatitis C.    Had labs prior to this visit and would like to discuss those results. She took Mavyret 3 tabs PO once daily x 8 weeks exactly as directed for treatment of Hep C. She is not using illicit drugs or drinking alcohol.     Interval History:  2023  She found out about having Hepatitis C infection approx 5 months ago. She has not had prior treatment for hepatitis. Reports no known personal history of liver disease including other viral hepatitis. There is no known family history of liver disease or cirrhosis. She admits to previous intranasal drug use. No IVDU. She does not have nonprofessional tattoos. Admits to having previous alcoholism, drank 6+ alcoholic beverages per day for approx 2 years. Has been clean now since a few months ago. 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 She does not currently drink alcohol. She denies  current illicit drug use including marijuana. No recent liver imaging. She has not had recent labs. She has had previous vaccinations for Hepatitis A and B. She is currently working full time as a nurse. No current GI complaints today, has known history of anxiety.     Subjective     Past Medical History:   Diagnosis Date    Allergic rhinitis     Generalized anxiety disorder      No past surgical history on file.  Family History   Problem Relation Age of Onset    Breast cancer Maternal Aunt      Social History     Socioeconomic History    Marital status:    Tobacco Use    Smoking status: Every Day     Packs/day: .5     Types: Cigarettes    Smokeless tobacco: Never   Vaping Use    Vaping Use: Never used   Substance  and Sexual Activity    Alcohol use: Not Currently    Drug use: Not Currently    Sexual activity: Defer     Current Outpatient Medications:     buPROPion SR (WELLBUTRIN SR) 150 MG 12 hr tablet, Take 1 tablet by mouth 2 (Two) Times a Day., Disp: , Rfl:     escitalopram (LEXAPRO) 20 MG tablet, Take 1 tablet by mouth Daily., Disp: , Rfl:     fluticasone (FLONASE) 50 MCG/ACT nasal spray, 2 sprays into the nostril(s) as directed by provider Daily As Needed., Disp: , Rfl:     loratadine (CLARITIN) 10 MG tablet, Take 1 tablet by mouth Daily., Disp: , Rfl:     No Known Allergies    The following portions of the patient's history were reviewed and updated as appropriate: allergies, current medications, past family history, past medical history, past social history, past surgical history and problem list.    Objective     Physical Exam  Constitutional:       General: She is not in acute distress.     Appearance: Normal appearance. She is well-developed. She is not diaphoretic.   HENT:      Head: Normocephalic and atraumatic.      Right Ear: External ear normal.      Left Ear: External ear normal.      Nose: Nose normal.   Eyes:      General: No scleral icterus.        Right eye: No discharge.         Left eye: No discharge.      Conjunctiva/sclera: Conjunctivae normal.   Neck:      Trachea: No tracheal deviation.   Pulmonary:      Effort: Pulmonary effort is normal. No respiratory distress.   Musculoskeletal:         General: Normal range of motion.      Cervical back: Normal range of motion.   Skin:     Coloration: Skin is not pale.      Findings: No erythema or rash.   Neurological:      Mental Status: She is alert and oriented to person, place, and time.      Coordination: Coordination normal.   Psychiatric:         Mood and Affect: Mood normal.         Behavior: Behavior normal.         Thought Content: Thought content normal.         Judgment: Judgment normal.       Vitals:    02/16/24 0912   BP: 103/63   Pulse: 80    SpO2: 100%   Weight: 67.6 kg (149 lb)     Results Review:   I reviewed the patient's new clinical results.    Lab on 02/08/2024   Component Date Value Ref Range Status    Glucose 02/08/2024 97  65 - 99 mg/dL Final    BUN 02/08/2024 11  6 - 20 mg/dL Final    Creatinine 02/08/2024 0.61  0.57 - 1.00 mg/dL Final    Sodium 02/08/2024 135 (L)  136 - 145 mmol/L Final    Potassium 02/08/2024 4.4  3.5 - 5.2 mmol/L Final    Chloride 02/08/2024 105  98 - 107 mmol/L Final    CO2 02/08/2024 23.0  22.0 - 29.0 mmol/L Final    Calcium 02/08/2024 8.9  8.6 - 10.5 mg/dL Final    Total Protein 02/08/2024 6.6  6.0 - 8.5 g/dL Final    Albumin 02/08/2024 4.7  3.5 - 5.2 g/dL Final    ALT (SGPT) 02/08/2024 6  1 - 33 U/L Final    AST (SGOT) 02/08/2024 10  1 - 32 U/L Final    Alkaline Phosphatase 02/08/2024 44  39 - 117 U/L Final    Total Bilirubin 02/08/2024 0.2  0.0 - 1.2 mg/dL Final    Globulin 02/08/2024 1.9  gm/dL Final    A/G Ratio 02/08/2024 2.5  g/dL Final    BUN/Creatinine Ratio 02/08/2024 18.0  7.0 - 25.0 Final    Anion Gap 02/08/2024 7.0  5.0 - 15.0 mmol/L Final    eGFR 02/08/2024 114.6  >60.0 mL/min/1.73 Final    Hepatitis C Quantitation 02/08/2024 HCV Not Detected  IU/mL Final    Test Information 02/08/2024 Comment   Final    The quantitative range of this assay is 15 IU/mL to 100 million IU/mL.      No radiology results for the last 90 days.     Assessment / Plan      1. History of hepatitis C virus infection  She had chronic hepatitis C infection, genotype 1a, treatment naïve, without cirrhosis (F0). She took Mavyret 3 tabs PO once daily for 8 weeks as directed for Hepatitis C therapy. She took this medication at the same time every day without missing any doses. Hep C viral load lab (HCV RNA quant) and CMP was checked at approx 4 weeks after start of therapy, HCV RNA not detected 10/2023. Labs at end of therapy 11/2023 HCV RNA not detected and final labs 2/2024 which was 3 months s/p therapy showed HCV RNA not detected.  Hepatitis C infection has been cured. She will always have positive Hepatitis C antibody due to previous infection. She is not immune to contract Hepatitis C again if she continues any risky behaviors. She understands to avoid any high risk behavior to prevent any reinfection.     She is immune to hepatitis A and reports vaccines to Hepatitis B in the past. Source of infection was her previous illicit drug use but she is currently not using. Liver imaging not completed. HIV test is negative. No history of liver transplant.      Hep C cured  No further monitoring needed    Follow Up:   Follow up in the GI clinic as needed. Will need screening colonoscopy at age 46.     Yvette Greene PA-C  [facility]  2/16/2024  17:04 EST    Dictated Utilizing Dragon Dictation: Part of this note may be an electronic transcription/translation of spoken language to printed text using the Dragon Dictation System.

## 2024-02-16 NOTE — LETTER
2024     ADELE Meléndez  [address]  [place] [address]    Patient: Shameka Masters   YOB: 1980   Date of Visit: 2024       Dear ADELE Meléndez    Shameka Masters was in my office today. Below is a copy of my note.    If you have questions, please do not hesitate to call me. I look forward to following Shameka along with you.         Sincerely,        [facility]        CC: No Recipients         Follow Up Note     Date: 2024   Patient Name: Shameka Masters  MRN: [id number]  : 1980     Primary Care Provider: Carmen Elkins APRN     Chief Complaint   Patient presents with   • Hepatitis C     Pt here for 3 month f/up     History of present illness:   2024  Shameka Masters is a 43 y.o. female who is here today for follow up regarding Hepatitis C.    Had labs prior to this visit and would like to discuss those results. She took Mavyret 3 tabs PO once daily x 8 weeks exactly as directed for treatment of Hep C. She is not using illicit drugs or drinking alcohol.     Interval History:  2023  She found out about having Hepatitis C infection approx 5 months ago. She has not had prior treatment for hepatitis. Reports no known personal history of liver disease including other viral hepatitis. There is no known family history of liver disease or cirrhosis. She admits to previous intranasal drug use. No IVDU. She does not have nonprofessional tattoos. Admits to having previous alcoholism, drank 6+ alcoholic beverages per day for approx 2 years. Has been clean now since a few months ago. 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 She does not currently drink alcohol. She denies  current illicit drug use including marijuana. No recent liver imaging. She has not had recent labs. She has had previous vaccinations for Hepatitis A and B. She is currently working full time as a nurse. No current GI complaints today, has known history of anxiety.     Subjective      Past Medical History:   Diagnosis Date   • Allergic rhinitis    • Generalized anxiety disorder      No past surgical history on file.  Family History   Problem Relation Age of Onset   • Breast cancer Maternal Aunt      Social History     Socioeconomic History   • Marital status:    Tobacco Use   • Smoking status: Every Day     Packs/day: .5     Types: Cigarettes   • Smokeless tobacco: Never   Vaping Use   • Vaping Use: Never used   Substance and Sexual Activity   • Alcohol use: Not Currently   • Drug use: Not Currently   • Sexual activity: Defer     Current Outpatient Medications:   •  buPROPion SR (WELLBUTRIN SR) 150 MG 12 hr tablet, Take 1 tablet by mouth 2 (Two) Times a Day., Disp: , Rfl:   •  escitalopram (LEXAPRO) 20 MG tablet, Take 1 tablet by mouth Daily., Disp: , Rfl:   •  fluticasone (FLONASE) 50 MCG/ACT nasal spray, 2 sprays into the nostril(s) as directed by provider Daily As Needed., Disp: , Rfl:   •  loratadine (CLARITIN) 10 MG tablet, Take 1 tablet by mouth Daily., Disp: , Rfl:     No Known Allergies    The following portions of the patient's history were reviewed and updated as appropriate: allergies, current medications, past family history, past medical history, past social history, past surgical history and problem list.    Objective     Physical Exam  Constitutional:       General: She is not in acute distress.     Appearance: Normal appearance. She is well-developed. She is not diaphoretic.   HENT:      Head: Normocephalic and atraumatic.      Right Ear: External ear normal.      Left Ear: External ear normal.      Nose: Nose normal.   Eyes:      General: No scleral icterus.        Right eye: No discharge.         Left eye: No discharge.      Conjunctiva/sclera: Conjunctivae normal.   Neck:      Trachea: No tracheal deviation.   Pulmonary:      Effort: Pulmonary effort is normal. No respiratory distress.   Musculoskeletal:         General: Normal range of motion.      Cervical back:  Normal range of motion.   Skin:     Coloration: Skin is not pale.      Findings: No erythema or rash.   Neurological:      Mental Status: She is alert and oriented to person, place, and time.      Coordination: Coordination normal.   Psychiatric:         Mood and Affect: Mood normal.         Behavior: Behavior normal.         Thought Content: Thought content normal.         Judgment: Judgment normal.       Vitals:    02/16/24 0912   BP: 103/63   Pulse: 80   SpO2: 100%   Weight: 67.6 kg (149 lb)     Results Review:   I reviewed the patient's new clinical results.    Lab on 02/08/2024   Component Date Value Ref Range Status   • Glucose 02/08/2024 97  65 - 99 mg/dL Final   • BUN 02/08/2024 11  6 - 20 mg/dL Final   • Creatinine 02/08/2024 0.61  0.57 - 1.00 mg/dL Final   • Sodium 02/08/2024 135 (L)  136 - 145 mmol/L Final   • Potassium 02/08/2024 4.4  3.5 - 5.2 mmol/L Final   • Chloride 02/08/2024 105  98 - 107 mmol/L Final   • CO2 02/08/2024 23.0  22.0 - 29.0 mmol/L Final   • Calcium 02/08/2024 8.9  8.6 - 10.5 mg/dL Final   • Total Protein 02/08/2024 6.6  6.0 - 8.5 g/dL Final   • Albumin 02/08/2024 4.7  3.5 - 5.2 g/dL Final   • ALT (SGPT) 02/08/2024 6  1 - 33 U/L Final   • AST (SGOT) 02/08/2024 10  1 - 32 U/L Final   • Alkaline Phosphatase 02/08/2024 44  39 - 117 U/L Final   • Total Bilirubin 02/08/2024 0.2  0.0 - 1.2 mg/dL Final   • Globulin 02/08/2024 1.9  gm/dL Final   • A/G Ratio 02/08/2024 2.5  g/dL Final   • BUN/Creatinine Ratio 02/08/2024 18.0  7.0 - 25.0 Final   • Anion Gap 02/08/2024 7.0  5.0 - 15.0 mmol/L Final   • eGFR 02/08/2024 114.6  >60.0 mL/min/1.73 Final   • Hepatitis C Quantitation 02/08/2024 HCV Not Detected  IU/mL Final   • Test Information 02/08/2024 Comment   Final    The quantitative range of this assay is 15 IU/mL to 100 million IU/mL.      No radiology results for the last 90 days.     Assessment / Plan      1. History of hepatitis C virus infection  She had chronic hepatitis C infection,  genotype 1a, treatment naïve, without cirrhosis (F0). She took Mavyret 3 tabs PO once daily for 8 weeks as directed for Hepatitis C therapy. She took this medication at the same time every day without missing any doses. Hep C viral load lab (HCV RNA quant) and CMP was checked at approx 4 weeks after start of therapy, HCV RNA not detected 10/2023. Labs at end of therapy 11/2023 HCV RNA not detected and final labs 2/2024 which was 3 months s/p therapy showed HCV RNA not detected. Hepatitis C infection has been cured. She will always have positive Hepatitis C antibody due to previous infection. She is not immune to contract Hepatitis C again if she continues any risky behaviors. She understands to avoid any high risk behavior to prevent any reinfection.     She is immune to hepatitis A and reports vaccines to Hepatitis B in the past. Source of infection was her previous illicit drug use but she is currently not using. Liver imaging not completed. HIV test is negative. No history of liver transplant.      Hep C cured  No further monitoring needed    Follow Up:   Follow up in the GI clinic as needed. Will need screening colonoscopy at age 46.     Yvette Greene PA-C  [facility]  2/16/2024  17:04 EST    Dictated Utilizing Dragon Dictation: Part of this note may be an electronic transcription/translation of spoken language to printed text using the Dragon Dictation System.

## 2024-02-23 PROBLEM — B18.2 CHRONIC HEPATITIS C WITHOUT HEPATIC COMA: Status: RESOLVED | Noted: 2023-09-15 | Resolved: 2024-02-23

## 2024-02-23 PROBLEM — Z86.19 HISTORY OF HEPATITIS C VIRUS INFECTION: Status: ACTIVE | Noted: 2024-02-23

## 2024-03-08 ENCOUNTER — HOSPITAL ENCOUNTER (EMERGENCY)
Facility: HOSPITAL | Age: 44
Discharge: HOME | End: 2024-03-08
Attending: EMERGENCY MEDICINE
Payer: COMMERCIAL

## 2024-03-08 VITALS
DIASTOLIC BLOOD PRESSURE: 90 MMHG | OXYGEN SATURATION: 100 % | WEIGHT: 141 LBS | SYSTOLIC BLOOD PRESSURE: 142 MMHG | RESPIRATION RATE: 16 BRPM | BODY MASS INDEX: 21.37 KG/M2 | HEIGHT: 68 IN | TEMPERATURE: 98.2 F | HEART RATE: 105 BPM

## 2024-03-08 DIAGNOSIS — S01.01XA LACERATION OF SCALP, INITIAL ENCOUNTER: Primary | ICD-10-CM

## 2024-03-08 PROCEDURE — 99283 EMERGENCY DEPT VISIT LOW MDM: CPT

## 2024-03-08 RX ORDER — IBUPROFEN 600 MG/1
600 TABLET ORAL ONCE
Status: COMPLETED | OUTPATIENT
Start: 2024-03-08 | End: 2024-03-08

## 2024-03-08 RX ADMIN — IBUPROFEN 600 MG: 600 TABLET, FILM COATED ORAL at 18:36

## 2024-03-08 NOTE — ED PROVIDER NOTES
EMERGENCY DEPARTMENT ENCOUNTER    Pt Name: Shameka Masters  MRN: [id number]  Pt :   1980  Room Number:  19SF/19  Date of encounter:  3/8/2024  PCP: Provider, No Known  ED Provider: Mau Mcclure PA-C    Historian: Patient      HPI:  Chief Complaint: head lacerations        Context: Shameka Masters is a 43 y.o. female who presents to the ED c/o 2 lacerations to her head since 1 hour PTA.  Patient reports at home a laundry basket fell on her head. She denies LOC, headache, vision changes, vomiting, somnolence, or any other complaint.  Patient states she has had a tetanus shot in the last 2 years      PAST MEDICAL HISTORY  Past Medical History:   Diagnosis Date    Allergic rhinitis     Generalized anxiety disorder          PAST SURGICAL HISTORY  History reviewed. No pertinent surgical history.      FAMILY HISTORY  Family History   Problem Relation Age of Onset    Breast cancer Maternal Aunt          SOCIAL HISTORY  Social History     Socioeconomic History    Marital status:    Tobacco Use    Smoking status: Every Day     Current packs/day: 0.50     Types: Cigarettes    Smokeless tobacco: Never   Vaping Use    Vaping status: Never Used   Substance and Sexual Activity    Alcohol use: Not Currently    Drug use: Not Currently    Sexual activity: Defer         ALLERGIES  Patient has no known allergies.        REVIEW OF SYSTEMS  Review of Systems   Constitutional:  Negative for chills and fever.   HENT:  Negative for congestion and sore throat.    Respiratory:  Negative for cough and shortness of breath.    Cardiovascular:  Negative for chest pain.   Gastrointestinal:  Negative for abdominal pain, nausea and vomiting.   Genitourinary:  Negative for dysuria.   Musculoskeletal:  Negative for back pain.   Skin:  Positive for wound.   Neurological:  Negative for dizziness and headaches.   Psychiatric/Behavioral:  Negative for confusion.    All other systems reviewed and are negative.         All systems  reviewed and negative except for those discussed in HPI.       PHYSICAL EXAM    I have reviewed the triage vital signs and nursing notes.    ED Triage Vitals [03/08/24 1757]   Temp Heart Rate Resp BP SpO2   98.2 °F (36.8 °C) 105 16 142/90 100 %      Temp src Heart Rate Source Patient Position BP Location FiO2 (%)   Oral Monitor Sitting Left arm --       Physical Exam  Vitals and nursing note reviewed.   Constitutional:       General: She is not in acute distress.     Appearance: She is not ill-appearing, toxic-appearing or diaphoretic.   HENT:      Head: Normocephalic and atraumatic.      Mouth/Throat:      Mouth: Mucous membranes are moist.      Pharynx: Oropharynx is clear.   Eyes:      Extraocular Movements: Extraocular movements intact.   Cardiovascular:      Rate and Rhythm: Normal rate.      Heart sounds: Normal heart sounds.   Pulmonary:      Effort: Pulmonary effort is normal. No respiratory distress.      Breath sounds: Normal breath sounds.   Abdominal:      Tenderness: There is no abdominal tenderness.   Skin:     General: Skin is warm and dry.      Findings: Laceration present. No rash.   Neurological:      Mental Status: She is alert.             LAB RESULTS  No results found for this or any previous visit (from the past 24 hour(s)).    If labs were ordered, I independently reviewed the results and considered them in treating the patient.        RADIOLOGY  No Radiology Exams Resulted Within Past 24 Hours    I ordered and independently reviewed the above noted radiographic studies.      I viewed images of n/a which showed n/a per my independent interpretation.    See radiologist's dictation for official interpretation.        PROCEDURES    Laceration Repair    Date/Time: 3/8/2024 6:37 PM    Performed by: Mau cMclure PA-C  Authorized by: Juan Eric MD    Consent:     Consent obtained:  Verbal    Consent given by:  Patient    Risks, benefits, and alternatives were discussed: yes       Risks discussed:  Infection, pain, poor cosmetic result and poor wound healing    Alternatives discussed:  No treatment  Universal protocol:     Procedure explained and questions answered to patient or proxy's satisfaction: yes      Imaging studies available: no      Patient identity confirmed:  Arm band  Anesthesia:     Anesthesia method:  Local infiltration    Local anesthetic:  Lidocaine 1% w/o epi  Laceration details:     Location:  Scalp    Scalp location:  Crown    Length (cm):  2.6    Depth (mm):  5  Pre-procedure details:     Preparation:  Patient was prepped and draped in usual sterile fashion  Exploration:     Hemostasis achieved with:  Direct pressure    Imaging outcome: foreign body not noted      Wound exploration: wound explored through full range of motion      Contaminated: no    Treatment:     Area cleansed with:  Chlorhexidine    Amount of cleaning:  Standard    Irrigation solution:  Sterile saline    Irrigation volume:  200    Irrigation method:  Syringe  Skin repair:     Repair method:  Staples    Number of staples:  3  Approximation:     Approximation:  Close  Repair type:     Repair type:  Simple  Post-procedure details:     Dressing:  Open (no dressing)    Procedure completion:  Tolerated well, no immediate complications  Laceration Repair    Date/Time: 3/8/2024 6:37 PM    Performed by: Mau Mcclure PA-C  Authorized by: Juan Eric MD    Consent:     Consent obtained:  Verbal    Consent given by:  Patient    Risks, benefits, and alternatives were discussed: yes      Risks discussed:  Infection, pain, poor cosmetic result and poor wound healing    Alternatives discussed:  No treatment  Universal protocol:     Procedure explained and questions answered to patient or proxy's satisfaction: yes      Patient identity confirmed:  Arm band  Anesthesia:     Anesthesia method:  Local infiltration    Local anesthetic:  Lidocaine 1% w/o epi  Laceration details:     Location:  Scalp     Scalp location:  Crown    Length (cm):  1.6    Depth (mm):  5  Pre-procedure details:     Preparation:  Patient was prepped and draped in usual sterile fashion  Exploration:     Hemostasis achieved with:  Direct pressure    Imaging outcome: foreign body not noted      Wound exploration: wound explored through full range of motion      Contaminated: no    Treatment:     Area cleansed with:  Chlorhexidine    Amount of cleaning:  Standard    Irrigation solution:  Sterile saline    Irrigation volume:  200    Irrigation method:  Syringe    Visualized foreign bodies/material removed: no    Skin repair:     Repair method:  Staples    Number of staples:  2  Approximation:     Approximation:  Close  Repair type:     Repair type:  Simple  Post-procedure details:     Dressing:  Open (no dressing)    Procedure completion:  Tolerated well, no immediate complications      No orders to display       MEDICATIONS GIVEN IN ER    Medications   ibuprofen (ADVIL,MOTRIN) tablet 600 mg (600 mg Oral Given 3/8/24 1836)         MEDICAL DECISION MAKING, PROGRESS, and CONSULTS    All labs, if obtained, have been independently reviewed by me.  All radiology studies, if obtained, have been reviewed by me and the radiologist dictating the report.  All EKG's, if obtained, have been independently viewed and interpreted by me/my attending physician.      Discussion below represents my analysis of pertinent findings related to patient's condition, differential diagnosis, treatment plan and final disposition.    Patient has 2 lacerations in the superior crown of the scalp.  1 laceration measures 2.6 cm, the second laceration measures 1.6 cm.  No active bleeding at time of exam.  No hemotympanum bilaterally oropharynx is clear of lesions or lacerations.  Pupils equally round and reactive, patient is upright alert oriented.  She has had no vomiting episodes, there was no loss of consciousness.  Laceration repair procedure was performed by myself using  staples.    3 staples placed in the larger laceration, 2 staple placed in the small laceration.  Patient tolerated procedure well with no complications.    I did offer the patient a CT scan of the head which she wants to defer at this time.  As she is having no nausea or vomiting, there was no LOC, no somnolence or lethargy her neuroexam is normal and she is GCS 15 I find that agreeable to forego imaging at this time strict ED return precautions for any signs or symptoms of intracranial hemorrhage were explained to the patient she will return to the ER if they develop.    [place] CT Head Injury/Trauma Rule - MDCalc  Calculated on Mar 08 2024 6:40 PM  CT Unnecessary -> The [place] Head CT Rule suggests a head CT is not necessary for this patient (sensitivity % for all intracranial traumatic findings, sensitivity 100% for findings requiring neurosurgical intervention).                     Differential diagnosis:    Differential diagnosis included was not limited to laceration, abrasion, contusion      Additional sources:    - Discussed/ obtained information from independent historians: Not applicable    - External (non-ED) record review: None    - Chronic or social conditions impacting care: None    - Shared decision making: Discussed risk versus benefit of CT imaging of the brain.  Through shared decision-making patient and I agreed to not perform any imaging imaging at this time I gave strict ED return precautions for signs of intracranial hemorrhage she will return if any of them develop.      Orders placed during this visit:  Orders Placed This Encounter   Procedures    Laceration Repair    Laceration Repair         Additional orders considered but not ordered:  None    ED Course:    Consultants:                  Shared Decision Making:  After my consideration of clinical presentation and any laboratory/radiology studies obtained, I discussed the findings with the patient/patient representative who is in  agreement with the treatment plan and the final disposition.   Risks and benefits of discharge and/or observation/admission were discussed.  Discharged home      AS OF 18:40 EST VITALS:    BP - 142/90  HR - 105  TEMP - 98.2 °F (36.8 °C) (Oral)  O2 SATS - 100%                  DIAGNOSIS  Final diagnoses:   Laceration of scalp, initial encounter         DISPOSITION        Please note that portions of this document were completed with voice recognition software.        Mau Mcclure PA-C  03/08/24 5489

## 2024-03-08 NOTE — DISCHARGE INSTRUCTIONS
Return to the ER for any acute changes or worsening of your condition including if you develop nausea or vomiting, lethargy, somnolence, headache, or any other complaint.  Your staples will need to be removed in the next 5 to 7 days.  Return to the ER for any signs of infection of your wound.

## 2024-03-22 ENCOUNTER — OFFICE VISIT (OUTPATIENT)
Dept: INTERNAL MEDICINE | Facility: CLINIC | Age: 44
End: 2024-03-22
Payer: COMMERCIAL

## 2024-03-22 VITALS
TEMPERATURE: 98.2 F | WEIGHT: 145 LBS | DIASTOLIC BLOOD PRESSURE: 64 MMHG | HEIGHT: 68 IN | OXYGEN SATURATION: 97 % | SYSTOLIC BLOOD PRESSURE: 108 MMHG | BODY MASS INDEX: 21.98 KG/M2 | HEART RATE: 66 BPM

## 2024-03-22 DIAGNOSIS — R82.90 ABNORMAL URINE ODOR: ICD-10-CM

## 2024-03-22 DIAGNOSIS — Z13.29 SCREENING FOR ENDOCRINE, METABOLIC AND IMMUNITY DISORDER: ICD-10-CM

## 2024-03-22 DIAGNOSIS — F33.0 MILD EPISODE OF RECURRENT MAJOR DEPRESSIVE DISORDER: ICD-10-CM

## 2024-03-22 DIAGNOSIS — R68.89 SENSATION OF FEELING COLD: ICD-10-CM

## 2024-03-22 DIAGNOSIS — R35.0 FREQUENT URINATION: ICD-10-CM

## 2024-03-22 DIAGNOSIS — Z13.228 SCREENING FOR ENDOCRINE, METABOLIC AND IMMUNITY DISORDER: ICD-10-CM

## 2024-03-22 DIAGNOSIS — Z76.89 ENCOUNTER TO ESTABLISH CARE: Primary | ICD-10-CM

## 2024-03-22 DIAGNOSIS — Z13.220 SCREENING FOR LIPID DISORDERS: ICD-10-CM

## 2024-03-22 DIAGNOSIS — R76.8 POSITIVE ANA (ANTINUCLEAR ANTIBODY): ICD-10-CM

## 2024-03-22 DIAGNOSIS — Z13.0 SCREENING FOR DISORDER OF BLOOD AND BLOOD-FORMING ORGANS: ICD-10-CM

## 2024-03-22 DIAGNOSIS — R61 NIGHT SWEATS: ICD-10-CM

## 2024-03-22 DIAGNOSIS — Z13.0 SCREENING FOR ENDOCRINE, METABOLIC AND IMMUNITY DISORDER: ICD-10-CM

## 2024-03-22 LAB
BILIRUB BLD-MCNC: NEGATIVE MG/DL
CLARITY, POC: CLEAR
COLOR UR: YELLOW
EXPIRATION DATE: ABNORMAL
GLUCOSE UR STRIP-MCNC: NEGATIVE MG/DL
KETONES UR QL: NEGATIVE
LEUKOCYTE EST, POC: NEGATIVE
Lab: ABNORMAL
NITRITE UR-MCNC: POSITIVE MG/ML
PH UR: 6 [PH] (ref 5–8)
PROT UR STRIP-MCNC: NEGATIVE MG/DL
RBC # UR STRIP: ABNORMAL /UL
SP GR UR: 1.01 (ref 1–1.03)
UROBILINOGEN UR QL: NORMAL

## 2024-03-22 RX ORDER — ESCITALOPRAM OXALATE 20 MG/1
20 TABLET ORAL DAILY
Qty: 90 TABLET | Refills: 1 | Status: SHIPPED | OUTPATIENT
Start: 2024-03-22

## 2024-03-22 RX ORDER — LORATADINE 10 MG/1
10 TABLET ORAL DAILY
Qty: 90 TABLET | Refills: 1 | Status: SHIPPED | OUTPATIENT
Start: 2024-03-22

## 2024-03-22 RX ORDER — NITROFURANTOIN 25; 75 MG/1; MG/1
100 CAPSULE ORAL 2 TIMES DAILY
Qty: 10 CAPSULE | Refills: 0 | Status: SHIPPED | OUTPATIENT
Start: 2024-03-22 | End: 2024-03-27

## 2024-03-22 RX ORDER — MAGNESIUM OXIDE 400 MG/1
400 TABLET ORAL DAILY
Qty: 90 TABLET | Refills: 1 | Status: SHIPPED | OUTPATIENT
Start: 2024-03-22

## 2024-03-22 RX ORDER — BUPROPION HYDROCHLORIDE 150 MG/1
150 TABLET, EXTENDED RELEASE ORAL 2 TIMES DAILY
Qty: 180 TABLET | Refills: 1 | Status: SHIPPED | OUTPATIENT
Start: 2024-03-22

## 2024-03-22 NOTE — PROGRESS NOTES
"Date: 2024    Name: Shameka Masters  : 1980    Chief Complaint:   Chief Complaint   Patient presents with    [facility]       HPI:  Shameka Masters is a 43 y.o. female presents to establish care.      Presented to [facility] ED on 3/8/2024 with 2 lacerations to her head.  Reported a laundry basket fell on her head.  Denies LOC, headache, vision change, vomiting, somnolence.  Neuroexam was normal.  Both lacerations closed with staples, tolerated procedure well.  Patient declined CT of the head at that time.      History of chronic hep c, treated with mayvret.  Released from GI.      Participating in the [employer] program. 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   Treated for depression.  Taking bupropion 150 mg twice daily, escitalopram 20 mg daily.  Feels medications are working well, requests refill.    Freezes all the time.  Fingers turn white.  Alternatively, overheats HS.  Wears cooling pj's.  Wakes up with sweat pooled on her chest, hair wet with sweat.  Employer required TB tests have been negative.      Urine \"stinks\".  Frequent urination.  Not like she's eating asparagus.  Darker in the mornings, clear through the day.  Working on drinking more water.  Drinks diet coke.       History:  LMP: 3/13/24  Menarche: 13 years old  Sexual activity: heterosexual, monogamous relationship x 24 years   has had vasectomy  Last pap date:   Abnormal pap? yes, cryotherapy  Mammogram in the past year  : 3  Para: 2    Do you take any herbs or supplements that were not prescribed by a doctor? no    Health Habits:  Dental Exam. up to date  Eye Exam. not up to date - no corrective lenses  Exercise: 4 times/week.  Current exercise activities include:  [employer] sobia, walking on treadmill   Current diet: well balanced     History:    Past Medical History:   Diagnosis Date    Allergic rhinitis     Generalized anxiety disorder        History reviewed. No pertinent surgical history.    Family History " "  Problem Relation Age of Onset    Breast cancer Maternal Aunt        Social History     Socioeconomic History    Marital status:    Tobacco Use    Smoking status: Every Day     Current packs/day: 0.50     Average packs/day: 0.5 packs/day for 20.0 years (10.0 ttl pk-yrs)     Types: Cigarettes    Smokeless tobacco: Never   Vaping Use    Vaping status: Never Used   Substance and Sexual Activity    Alcohol use: Not Currently    Drug use: Not Currently    Sexual activity: Defer       No Known Allergies      Current Outpatient Medications:     buPROPion SR (WELLBUTRIN SR) 150 MG 12 hr tablet, Take 1 tablet by mouth 2 (Two) Times a Day., Disp: 180 tablet, Rfl: 1    escitalopram (LEXAPRO) 20 MG tablet, Take 1 tablet by mouth Daily., Disp: 90 tablet, Rfl: 1    fluticasone (FLONASE) 50 MCG/ACT nasal spray, 2 sprays into the nostril(s) as directed by provider Daily As Needed., Disp: , Rfl:     loratadine (CLARITIN) 10 MG tablet, Take 1 tablet by mouth Daily., Disp: 90 tablet, Rfl: 1    magnesium oxide (MAG-OX) 400 MG tablet, Take 1 tablet by mouth Daily., Disp: 90 tablet, Rfl: 1      VS:  Vitals:    03/22/24 0854   BP: 108/64   Pulse: 66   Temp: 98.2 °F (36.8 °C)   SpO2: 97%   Weight: 65.8 kg (145 lb)   Height: 172.7 cm (67.99\")     Body mass index is 22.05 kg/m².  BMI is within normal parameters. No other follow-up for BMI required.       PE:  Physical Exam  Constitutional:       Appearance: She is not ill-appearing.   HENT:      Head: Normocephalic.      Right Ear: External ear normal.      Left Ear: External ear normal.   Eyes:      Conjunctiva/sclera: Conjunctivae normal.      Pupils: Pupils are equal, round, and reactive to light.   Neck:      Thyroid: No thyroid mass, thyromegaly or thyroid tenderness.   Cardiovascular:      Rate and Rhythm: Normal rate and regular rhythm.      Pulses:           Radial pulses are 2+ on the right side and 2+ on the left side.        Dorsalis pedis pulses are 2+ on the right side " and 2+ on the left side.      Heart sounds: Normal heart sounds.   Pulmonary:      Effort: Pulmonary effort is normal.      Breath sounds: Normal breath sounds.   Musculoskeletal:      Cervical back: Normal range of motion and neck supple.      Right lower leg: No edema.      Left lower leg: No edema.   Skin:     General: Skin is warm.      Capillary Refill: Capillary refill takes less than 2 seconds.   Neurological:      Mental Status: She is alert and oriented to person, place, and time.      Coordination: Coordination normal.      Gait: Gait normal.   Psychiatric:         Mood and Affect: Mood normal.         Behavior: Behavior normal.         Thought Content: Thought content normal.         Assessment/Plan:         Diagnoses and all orders for this visit:    1. Encounter to establish care (Primary)    2. Mild episode of recurrent major depressive disorder        - Encouraged to take part in daily physical exercise.          - Eat healthy, well balanced diet; avoid sugary foods or beverages        - Continue to abstain from alcohol and drugs        - Ensure good night's sleep by creating calm space in bedroom, avoiding screen time 1-2 hours before bed, no caffeine after 5 pm        - Talk to supportive family and friends, as needed        - Continue medications as prescribed    3. Night sweats        - TSH, free T4, free T3, thyroid antibodies, hemoglobin A1c    4. Abnormal urine odor  -     POCT urinalysis dipstick, automated  -     Urine Culture - Urine, Urine, Clean Catch; Future    5. Frequent urination  -     Hemoglobin A1c    6. Sensation of feeling cold  -     T4, Free  -     TSH  -     T3, Free  -     Thyroid Antibodies  -     C-reactive protein  -     JOÃO Profile 11 (RDL)    7. Screening for lipid disorders  -     Lipid Panel    8. Screening for endocrine, metabolic and immunity disorder  -     Hemoglobin A1c  -     T4, Free  -     TSH  -     T3, Free  -     Thyroid Antibodies    9. Screening for  disorder of blood and blood-forming organs  -     CBC & Differential            Return in about 6 months (around 9/22/2024) for Annual.

## 2024-03-24 LAB
BACTERIA UR CULT: ABNORMAL
BACTERIA UR CULT: ABNORMAL

## 2024-03-26 LAB
BACTERIA UR CULT: ABNORMAL
BACTERIA UR CULT: ABNORMAL
OTHER ANTIBIOTIC SUSC ISLT: ABNORMAL

## 2024-04-02 LAB
ANA PROFILE 11 EIA INTERP: ABNORMAL
ANA SER QL IF: POSITIVE
ANA SPECKLED TITR SER: ABNORMAL {TITER}
BASOPHILS # BLD AUTO: 0.05 10*3/MM3 (ref 0–0.2)
BASOPHILS NFR BLD AUTO: 0.7 % (ref 0–1.5)
C3 SERPL-MCNC: 110 MG/DL (ref 82–167)
C4 SERPL-MCNC: 25 MG/DL (ref 14–44)
CENTROMERE AB TITR SER IF: ABNORMAL {TITER}
CHOLEST SERPL-MCNC: 172 MG/DL (ref 0–200)
CRP SERPL-MCNC: <0.3 MG/DL (ref 0–0.5)
DSDNA AB SER FARR-ACNC: <8 IU/ML
ENA SCL70 AB SER IA-ACNC: <20 UNITS
ENA SM AB SER-ACNC: <20 UNITS
ENA SS-A AB SER IA-ACNC: <20 UNITS
ENA SS-B AB SER IA-ACNC: <20 UNITS
EOSINOPHIL # BLD AUTO: 0.18 10*3/MM3 (ref 0–0.4)
EOSINOPHIL NFR BLD AUTO: 2.3 % (ref 0.3–6.2)
ERYTHROCYTE [DISTWIDTH] IN BLOOD BY AUTOMATED COUNT: 12.2 % (ref 12.3–15.4)
HBA1C MFR BLD: 5.6 % (ref 4.8–5.6)
HCT VFR BLD AUTO: 43.5 % (ref 34–46.6)
HDLC SERPL-MCNC: 50 MG/DL (ref 40–60)
HGB BLD-MCNC: 14.2 G/DL (ref 12–15.9)
IMM GRANULOCYTES # BLD AUTO: 0.02 10*3/MM3 (ref 0–0.05)
IMM GRANULOCYTES NFR BLD AUTO: 0.3 % (ref 0–0.5)
LABORATORY COMMENT REPORT: ABNORMAL
LDLC SERPL CALC-MCNC: 110 MG/DL (ref 0–100)
LYMPHOCYTES # BLD AUTO: 2.53 10*3/MM3 (ref 0.7–3.1)
LYMPHOCYTES NFR BLD AUTO: 33 % (ref 19.6–45.3)
MCH RBC QN AUTO: 29 PG (ref 26.6–33)
MCHC RBC AUTO-ENTMCNC: 32.6 G/DL (ref 31.5–35.7)
MCV RBC AUTO: 88.8 FL (ref 79–97)
MONOCYTES # BLD AUTO: 0.27 10*3/MM3 (ref 0.1–0.9)
MONOCYTES NFR BLD AUTO: 3.5 % (ref 5–12)
NEUTROPHILS # BLD AUTO: 4.61 10*3/MM3 (ref 1.7–7)
NEUTROPHILS NFR BLD AUTO: 60.2 % (ref 42.7–76)
NRBC BLD AUTO-RTO: 0 /100 WBC (ref 0–0.2)
PLATELET # BLD AUTO: 424 10*3/MM3 (ref 140–450)
RBC # BLD AUTO: 4.9 10*6/MM3 (ref 3.77–5.28)
T3FREE SERPL-MCNC: 3.1 PG/ML (ref 2–4.4)
T4 FREE SERPL-MCNC: 1.36 NG/DL (ref 0.93–1.7)
THYROGLOB AB SERPL-ACNC: <1 IU/ML (ref 0–0.9)
THYROPEROXIDASE AB SERPL-ACNC: <9 IU/ML (ref 0–34)
TRIGL SERPL-MCNC: 62 MG/DL (ref 0–150)
TSH SERPL DL<=0.005 MIU/L-ACNC: 1.18 UIU/ML (ref 0.27–4.2)
U1 SNRNP AB SER IA-ACNC: <20 UNITS
VLDLC SERPL CALC-MCNC: 12 MG/DL (ref 5–40)
WBC # BLD AUTO: 7.66 10*3/MM3 (ref 3.4–10.8)

## 2024-05-01 ENCOUNTER — PATIENT MESSAGE (OUTPATIENT)
Dept: INTERNAL MEDICINE | Facility: CLINIC | Age: 44
End: 2024-05-01
Payer: COMMERCIAL

## 2024-05-01 DIAGNOSIS — R82.90 BAD ODOR OF URINE: Primary | ICD-10-CM

## 2024-05-03 NOTE — TELEPHONE ENCOUNTER
From: Shameka Masters  To: Odilia Lawrence  Sent: 5/1/2024 12:40 PM EDT  Subject: UA results    Hello!  On my last visit I had a UA because of urine having an odor. I was diagnosed and treated for UTI. I am otherwise asymptomatic but I didn’t see an improvement in urine odor. I didn’t know if I should test again or just not be concerned since that’s the only complaint.   Thank you,   Shameka Masters  [phone]  [email or web address]

## 2024-05-06 ENCOUNTER — CLINICAL SUPPORT (OUTPATIENT)
Dept: INTERNAL MEDICINE | Facility: CLINIC | Age: 44
End: 2024-05-06
Payer: COMMERCIAL

## 2024-05-06 DIAGNOSIS — R82.90 BAD ODOR OF URINE: ICD-10-CM

## 2024-05-06 LAB
BILIRUB BLD-MCNC: NEGATIVE MG/DL
CLARITY, POC: CLEAR
COLOR UR: YELLOW
EXPIRATION DATE: ABNORMAL
GLUCOSE UR STRIP-MCNC: NEGATIVE MG/DL
KETONES UR QL: NEGATIVE
LEUKOCYTE EST, POC: NEGATIVE
Lab: ABNORMAL
NITRITE UR-MCNC: NEGATIVE MG/ML
PH UR: 6 [PH] (ref 5–8)
PROT UR STRIP-MCNC: NEGATIVE MG/DL
RBC # UR STRIP: NEGATIVE /UL
SP GR UR: 1.01 (ref 1–1.03)
UROBILINOGEN UR QL: ABNORMAL

## 2024-05-06 PROCEDURE — 81003 URINALYSIS AUTO W/O SCOPE: CPT | Performed by: NURSE PRACTITIONER

## 2024-05-09 LAB
BACTERIA UR CULT: ABNORMAL
BACTERIA UR CULT: ABNORMAL
M GENITALIUM DNA SPEC QL NAA+PROBE: NEGATIVE
M HOMINIS DNA SPEC QL NAA+PROBE: NEGATIVE
OTHER ANTIBIOTIC SUSC ISLT: ABNORMAL
UREAPLASMA DNA SPEC QL NAA+PROBE: POSITIVE

## 2024-07-05 ENCOUNTER — TRANSCRIBE ORDERS (OUTPATIENT)
Dept: ADMINISTRATIVE | Facility: HOSPITAL | Age: 44
End: 2024-07-05
Payer: COMMERCIAL

## 2024-07-05 DIAGNOSIS — Z12.31 VISIT FOR SCREENING MAMMOGRAM: Primary | ICD-10-CM

## 2024-07-18 ENCOUNTER — SPECIALTY PHARMACY (OUTPATIENT)
Dept: PHARMACY | Facility: HOSPITAL | Age: 44
End: 2024-07-18
Payer: COMMERCIAL

## 2024-08-30 ENCOUNTER — HOSPITAL ENCOUNTER (OUTPATIENT)
Dept: MAMMOGRAPHY | Facility: HOSPITAL | Age: 44
Discharge: HOME | End: 2024-08-30
Admitting: NURSE PRACTITIONER
Payer: COMMERCIAL

## 2024-08-30 DIAGNOSIS — Z12.31 VISIT FOR SCREENING MAMMOGRAM: ICD-10-CM

## 2024-08-30 PROCEDURE — 77063 BREAST TOMOSYNTHESIS BI: CPT

## 2024-08-30 PROCEDURE — 77067 SCR MAMMO BI INCL CAD: CPT

## 2024-09-06 ENCOUNTER — TRANSCRIBE ORDERS (OUTPATIENT)
Dept: ADMINISTRATIVE | Facility: HOSPITAL | Age: 44
End: 2024-09-06
Payer: COMMERCIAL

## 2024-09-06 DIAGNOSIS — R92.8 ABNORMAL MAMMOGRAM: Primary | ICD-10-CM

## 2024-09-20 ENCOUNTER — TELEPHONE (OUTPATIENT)
Dept: MAMMOGRAPHY | Facility: HOSPITAL | Age: 44
End: 2024-09-20
Payer: COMMERCIAL

## 2024-09-24 ENCOUNTER — OFFICE VISIT (OUTPATIENT)
Dept: INTERNAL MEDICINE | Facility: CLINIC | Age: 44
End: 2024-09-24
Payer: COMMERCIAL

## 2024-09-24 VITALS
TEMPERATURE: 98.2 F | DIASTOLIC BLOOD PRESSURE: 70 MMHG | OXYGEN SATURATION: 95 % | BODY MASS INDEX: 22.43 KG/M2 | SYSTOLIC BLOOD PRESSURE: 104 MMHG | HEIGHT: 68 IN | WEIGHT: 148 LBS | HEART RATE: 93 BPM

## 2024-09-24 DIAGNOSIS — H60.502 ACUTE OTITIS EXTERNA OF LEFT EAR, UNSPECIFIED TYPE: ICD-10-CM

## 2024-09-24 DIAGNOSIS — I73.00 RAYNAUD'S DISEASE WITHOUT GANGRENE: ICD-10-CM

## 2024-09-24 DIAGNOSIS — F33.0 MILD EPISODE OF RECURRENT MAJOR DEPRESSIVE DISORDER: ICD-10-CM

## 2024-09-24 DIAGNOSIS — J40 BRONCHITIS: ICD-10-CM

## 2024-09-24 DIAGNOSIS — Z00.00 ENCOUNTER FOR ANNUAL PHYSICAL EXAMINATION EXCLUDING GYNECOLOGICAL EXAMINATION IN A PATIENT OLDER THAN 17 YEARS: Primary | ICD-10-CM

## 2024-09-24 PROCEDURE — 99396 PREV VISIT EST AGE 40-64: CPT | Performed by: NURSE PRACTITIONER

## 2024-09-24 RX ORDER — DEXTROMETHORPHAN HYDROBROMIDE AND PROMETHAZINE HYDROCHLORIDE 15; 6.25 MG/5ML; MG/5ML
5 SYRUP ORAL 4 TIMES DAILY PRN
Qty: 240 ML | Refills: 0 | Status: SHIPPED | OUTPATIENT
Start: 2024-09-24 | End: 2024-10-07

## 2024-09-24 RX ORDER — CIPROFLOXACIN AND DEXAMETHASONE 3; 1 MG/ML; MG/ML
4 SUSPENSION/ DROPS AURICULAR (OTIC) 2 TIMES DAILY
Qty: 7.5 ML | Refills: 0 | Status: SHIPPED | OUTPATIENT
Start: 2024-09-24 | End: 2024-10-14

## 2024-09-24 RX ORDER — AZITHROMYCIN 250 MG/1
TABLET, FILM COATED ORAL
Qty: 6 TABLET | Refills: 0 | Status: SHIPPED | OUTPATIENT
Start: 2024-09-24

## 2024-09-25 RX ORDER — BUPROPION HYDROCHLORIDE 150 MG/1
150 TABLET, EXTENDED RELEASE ORAL 2 TIMES DAILY
Qty: 180 TABLET | Refills: 1 | Status: SHIPPED | OUTPATIENT
Start: 2024-09-25

## 2024-09-25 RX ORDER — ESCITALOPRAM OXALATE 20 MG/1
20 TABLET ORAL DAILY
Qty: 90 TABLET | Refills: 1 | Status: SHIPPED | OUTPATIENT
Start: 2024-09-25

## 2024-10-04 ENCOUNTER — HOSPITAL ENCOUNTER (OUTPATIENT)
Dept: ULTRASOUND IMAGING | Facility: HOSPITAL | Age: 44
Discharge: HOME | End: 2024-10-04
Payer: COMMERCIAL

## 2024-10-04 ENCOUNTER — HOSPITAL ENCOUNTER (OUTPATIENT)
Dept: MAMMOGRAPHY | Facility: HOSPITAL | Age: 44
Discharge: HOME | End: 2024-10-04
Payer: COMMERCIAL

## 2024-10-04 DIAGNOSIS — R92.8 ABNORMAL MAMMOGRAM: ICD-10-CM

## 2024-10-04 PROCEDURE — 76642 ULTRASOUND BREAST LIMITED: CPT

## 2024-10-04 PROCEDURE — 77066 DX MAMMO INCL CAD BI: CPT

## 2024-10-04 PROCEDURE — G0279 TOMOSYNTHESIS, MAMMO: HCPCS

## 2025-03-03 ENCOUNTER — TELEPHONE (OUTPATIENT)
Dept: INTERNAL MEDICINE | Facility: CLINIC | Age: 45
End: 2025-03-03
Payer: COMMERCIAL

## 2025-03-03 NOTE — TELEPHONE ENCOUNTER
Caller: Shameka Masters    Relationship: Self    Best call back number: [phone]    What form or medical record are you requesting: DOCUMENTATION FOR WORK STATING YOU HAVE HAD A PHYSICAL WITHIN THE PAST YEAR.    Who is requesting this form or medical record from you: PATIENT/ PATIENT WORK     How would you like to receive the form or medical records (pick-up, mail, fax):    Timeframe paperwork needed: ASAP     Additional notes: PLEASE CALL ONCE ABLE TO .

## 2025-03-28 RX ORDER — ESCITALOPRAM OXALATE 20 MG/1
20 TABLET ORAL DAILY
Qty: 90 TABLET | Refills: 1 | Status: SHIPPED | OUTPATIENT
Start: 2025-03-28

## 2025-03-28 RX ORDER — BUPROPION HYDROCHLORIDE 150 MG/1
150 TABLET, EXTENDED RELEASE ORAL 2 TIMES DAILY
Qty: 180 TABLET | Refills: 1 | Status: SHIPPED | OUTPATIENT
Start: 2025-03-28

## 2025-05-12 ENCOUNTER — OFFICE VISIT (OUTPATIENT)
Dept: INTERNAL MEDICINE | Facility: CLINIC | Age: 45
End: 2025-05-12
Payer: COMMERCIAL

## 2025-05-12 VITALS
HEART RATE: 73 BPM | HEIGHT: 68 IN | SYSTOLIC BLOOD PRESSURE: 116 MMHG | BODY MASS INDEX: 24.1 KG/M2 | WEIGHT: 159 LBS | DIASTOLIC BLOOD PRESSURE: 60 MMHG | TEMPERATURE: 98.7 F | OXYGEN SATURATION: 100 %

## 2025-05-12 DIAGNOSIS — H92.03 CHRONIC PAIN OF BOTH EARS: Primary | ICD-10-CM

## 2025-05-12 DIAGNOSIS — L72.3 SEBACEOUS CYST OF AXILLA: ICD-10-CM

## 2025-05-12 DIAGNOSIS — G89.29 CHRONIC PAIN OF BOTH EARS: Primary | ICD-10-CM

## 2025-05-12 PROCEDURE — 99214 OFFICE O/P EST MOD 30 MIN: CPT | Performed by: NURSE PRACTITIONER

## 2025-05-12 NOTE — PROGRESS NOTES
Office Visit      Patient Name: Shameka Masters  : 1980   MRN: [id number]     Chief Complaint:    Chief Complaint   Patient presents with    axillary bump     right       History of Present Illness  Shameka Masters is a 44 y.o. female who presents for evaluation of a bump under her arm and chronic ear pain.    She reports the development of a large, painful bump under her arm on either 2025 or 2025, which has been causing discomfort since 2025. Despite attempts at self-drainage, the bump persists, although it has significantly reduced in size. She describes a residual knot-like sensation in the area but reports no associated numbness or tingling. Initially, she suspected the bump to be an ingrown hair. The pain has subsided, and the bump is no longer tender to touch. She has been applying warm compresses to the area.    She also reports chronic ear pain, which has been previously evaluated by an ENT specialist. The specialist diagnosed her with eczema in the ear, attributed to excessive wax removal. However, she believes this diagnosis to be inaccurate as she had cleaned her ears prior to the consultation. The ear pain is severe enough to disrupt her sleep, although the intensity varies. She occasionally experiences itching in the ears and a constant wet sensation, but no drainage. She does not believe her symptoms are indicative of a typical ear infection, describing the sensation as more of a burning than a throbbing pain. She has not attempted treatment with sweet oil. A prescribed cream for the eczema, given 2 years ago, did not alleviate her symptoms.        Subjective      I have reviewed and the following portions of the patient's history were updated as appropriate: past family history, past medical history, past social history, past surgical history and problem list.      Current Outpatient Medications:     buPROPion SR (WELLBUTRIN SR) 150 MG 12 hr tablet, Take 1 tablet by  "mouth 2 (Two) Times a Day., Disp: 180 tablet, Rfl: 1    escitalopram (LEXAPRO) 20 MG tablet, Take 1 tablet by mouth Daily., Disp: 90 tablet, Rfl: 1    fluticasone (FLONASE) 50 MCG/ACT nasal spray, 2 sprays into the nostril(s) as directed by provider Daily As Needed., Disp: , Rfl:     loratadine (CLARITIN) 10 MG tablet, Take 1 tablet by mouth Daily., Disp: 90 tablet, Rfl: 1    magnesium oxide (MAG-OX) 400 MG tablet, Take 1 tablet by mouth Daily., Disp: 90 tablet, Rfl: 1    No Known Allergies    Objective     Physical Exam:  Vital Signs:   Vitals:    05/12/25 1549   BP: 116/60   Pulse: 73   Temp: 98.7 °F (37.1 °C)   SpO2: 100%   Weight: 72.1 kg (159 lb)   Height: 172.7 cm (67.99\")     Body mass index is 24.18 kg/m².  BMI is within normal parameters. No other follow-up for BMI required.       Physical Exam  Constitutional:       Appearance: She is not ill-appearing.   HENT:      Head: Normocephalic.      Right Ear: Tympanic membrane and external ear normal.      Left Ear: Tympanic membrane and external ear normal.      Ears:      Comments: Bilateral excessive dry skin, erythematous ear canals  Eyes:      Conjunctiva/sclera: Conjunctivae normal.      Pupils: Pupils are equal, round, and reactive to light.   Cardiovascular:      Rate and Rhythm: Normal rate and regular rhythm.      Heart sounds: Normal heart sounds.   Pulmonary:      Effort: Pulmonary effort is normal.      Breath sounds: Normal breath sounds.   Musculoskeletal:      Cervical back: Normal range of motion and neck supple.   Skin:     General: Skin is warm.      Capillary Refill: Capillary refill takes less than 2 seconds.      Comments: Small, firm raised area beneath axilla. Not tender, fluctuant, draining.     Neurological:      Mental Status: She is alert and oriented to person, place, and time.      Coordination: Coordination normal.      Gait: Gait normal.   Psychiatric:         Mood and Affect: Mood normal.         Behavior: Behavior normal.         " Thought Content: Thought content normal.             Assessment / Plan      Assessment/Plan:   Diagnoses and all orders for this visit:    1. Chronic pain of both ears (Primary)       - Sweet oil once a week to ears    2. Sebaceous cyst of axilla        - Continue warm compresses.  If symptoms persist will send rx for antibiotics           Follow Up:   Return if symptoms worsen or fail to improve.    Patient was given instructions and counseling regarding her condition or for health maintenance advice. Please see specific information pulled into the AVS if appropriate.       [facility] [address] [facility]     Please note that portions of this note may have been completed with a voice recognition program. Efforts were made to edit dictation, but occasionally words are mistranscribed.